# Patient Record
Sex: FEMALE | Race: BLACK OR AFRICAN AMERICAN | NOT HISPANIC OR LATINO | Employment: OTHER | URBAN - METROPOLITAN AREA
[De-identification: names, ages, dates, MRNs, and addresses within clinical notes are randomized per-mention and may not be internally consistent; named-entity substitution may affect disease eponyms.]

---

## 2018-04-13 PROBLEM — K44.0 HIATAL HERNIA WITH OBSTRUCTION BUT NO GANGRENE: Status: ACTIVE | Noted: 2018-04-13

## 2018-04-14 PROBLEM — K21.00 REFLUX ESOPHAGITIS: Status: RESOLVED | Noted: 2018-04-14 | Resolved: 2018-04-14

## 2018-04-14 PROBLEM — K44.0 HIATAL HERNIA WITH OBSTRUCTION BUT NO GANGRENE: Status: RESOLVED | Noted: 2018-04-13 | Resolved: 2018-04-14

## 2018-04-14 PROBLEM — K21.00 REFLUX ESOPHAGITIS: Status: ACTIVE | Noted: 2018-04-14

## 2023-10-13 PROBLEM — D34 THYROID ADENOMA: Status: ACTIVE | Noted: 2023-10-13

## 2023-10-13 PROBLEM — E04.9 ENLARGEMENT OF THYROID: Status: RESOLVED | Noted: 2023-10-13 | Resolved: 2023-10-13

## 2023-10-13 PROBLEM — E04.9 ENLARGEMENT OF THYROID: Status: ACTIVE | Noted: 2023-10-13

## 2023-10-13 PROBLEM — D34 THYROID ADENOMA: Status: RESOLVED | Noted: 2023-10-13 | Resolved: 2023-10-13

## 2024-05-29 DIAGNOSIS — C50.919 INVASIVE LOBULAR CARCINOMA OF BREAST IN FEMALE: Primary | ICD-10-CM

## 2024-05-30 PROBLEM — C50.911 INVASIVE LOBULAR CARCINOMA OF RIGHT BREAST IN FEMALE: Status: ACTIVE | Noted: 2024-05-30

## 2024-05-30 NOTE — PROGRESS NOTES
[facility] - Breast Surgery   History and Physical    Subjective:      Mara Dailey is a 73 y.o. female     No symptoms. Found on imaging.       PMHX  Hysterectomy   Right Cyst Aspiration   Left Breast Biopsy  (Benign) [place]         Relevant info:Patient is here with spouse       Family history of cancer: Father diagnosed with lung cancer @ 70 years old         Menarche at age 12 years old. . Age at first live birth 33 years old. did not breast feed. LMP . OCP-over 30+ years ago  partial Hysterectomy . Menopause at 53 years old. Denies HRT- Denies Personal history of ovarian/breast. Denies previous breast biopsy. Denies Genetic testing.       Never a smoker    Received Covid-19 vaccine left arm all shot and boosters            Patient Active Problem List   Diagnosis    Invasive lobular carcinoma of right breast in female     Current Outpatient Medications on File Prior to Visit   Medication Sig Dispense Refill    acetaminophen (TYLENOL) 500 MG tablet Take 1 tablet (500 mg total) by mouth every 6 (six) hours as needed for Pain.  0    latanoprost 0.005 % ophthalmic solution 1 drop.      losartan (COZAAR) 100 MG tablet Take 100 mg by mouth.      simvastatin (ZOCOR) 40 MG tablet Take 40 mg by mouth every evening.      valsartan-hydrochlorothiazide (DIOVAN-HCT) 320-25 mg per tablet Take 1 tablet by mouth once daily.      hydroCHLOROthiazide (HYDRODIURIL) 25 MG tablet Take 1 tablet (25 mg total) by mouth once daily. (Patient not taking: Reported on 10/9/2023) 30 tablet 11    omeprazole (PRILOSEC) 40 MG capsule Take 40 mg by mouth daily as needed. (Patient not taking: Reported on 2024)       No current facility-administered medications on file prior to visit.     Review of patient's allergies indicates:   Allergen Reactions    Grass pollen-landon grass standard      Past Medical History:   Diagnosis Date    Enlargement of thyroid 10/2023    Hiatal hernia with obstruction but no  gangrene 04/2018    Hyperlipidemia 04/2018    Hypertension     Reflux esophagitis 04/2018     Past Surgical History:   Procedure Laterality Date    AXILLARY HIDRADENITIS EXCISION Right 1980    BREAST BIOPSY Left 2011/2012    benign needle biopsy, done in [place]    BREAST CYST ASPIRATION Right     HYSTERECTOMY  2004    LAPAROSCOPIC REPAIR OF HIATAL HERNIA      THYROID LOBECTOMY Right 10/13/2023    Procedure: LOBECTOMY, THYROID;  Surgeon: Endy Ibarra MD;  Location: [facility];  Service: General;  Laterality: Right;     Family History   Problem Relation Name Age of Onset    Heart disease Mother      Kidney disease Mother      Diabetes Mother      Hypertension Mother      Cancer Father          lung     Social History     Socioeconomic History    Marital status:    Tobacco Use    Smoking status: Never    Smokeless tobacco: Never   Substance and Sexual Activity    Alcohol use: No    Drug use: No     Social Determinants of Health     Financial Resource Strain: Low Risk  (6/2/2024)    Overall Financial Resource Strain (CARDIA)     Difficulty of Paying Living Expenses: Not hard at all   Food Insecurity: No Food Insecurity (6/2/2024)    Hunger Vital Sign     Worried About Running Out of Food in the Last Year: Never true     Ran Out of Food in the Last Year: Never true   Physical Activity: Insufficiently Active (6/2/2024)    Exercise Vital Sign     Days of Exercise per Week: 2 days     Minutes of Exercise per Session: 10 min   Stress: No Stress Concern Present (6/2/2024)    [employer] - Occupational Stress Questionnaire     Feeling of Stress : Not at all   Housing Stability: Unknown (6/2/2024)    Housing Stability Vital Sign     Unable to Pay for Housing in the Last Year: No         Review of Systems    No CP, No SOB      Objective:      /80 (BP Location: Right arm, Patient Position: Sitting, BP Method: Medium (Manual))   Pulse 92   Temp 97 °F (36.1 °C) (Temporal)   Resp 16   " Ht 5' 6" (1.676 m)   Wt 79.3 kg (174 lb 13.2 oz)   SpO2 98%   BMI 28.22 kg/m²     Constitutional: NAD AAOX4  HEENT: EOMI, nonicteric sclera  NECK: no mass, no thyromegaly, no lymphadenopathy  CV: regular rate  PULM: good respiratory effort  ABDOMEN: soft, Nontender, nondistended. No guarding. No rebound.  MUSCULOSKELETAL: Good ROM  SKIN: No rashes or ulcerations seen.   NEUROLOGIC: CN grossly intact. Motor, sensory grossly intact    Breast exam   pendulous  Right: No mass, discharge, dimpling, lymphadenopathy  Left:  No mass, discharge, dimpling, lymphadenopathy  No masses appreciated      No visits with results within 6 Week(s) from this visit.   Latest known visit with results is:   Lab Visit on 03/18/2024   Component Date Value Ref Range Status    WBC 03/18/2024 4.89  3.90 - 12.70 K/uL Final    RBC 03/18/2024 5.34  4.00 - 5.40 M/uL Final    Hemoglobin 03/18/2024 15.2  12.0 - 16.0 g/dL Final    Hematocrit 03/18/2024 45.8  37.0 - 48.5 % Final    MCV 03/18/2024 86  82 - 98 fL Final    MCH 03/18/2024 28.5  27.0 - 31.0 pg Final    MCHC 03/18/2024 33.2  32.0 - 36.0 g/dL Final    RDW 03/18/2024 12.7  11.5 - 14.5 % Final    Platelets 03/18/2024 274  150 - 450 K/uL Final    MPV 03/18/2024 10.9  9.2 - 12.9 fL Final    Immature Granulocytes 03/18/2024 0.2  0.0 - 0.5 % Final    Gran # (ANC) 03/18/2024 2.5  1.8 - 7.7 K/uL Final    Immature Grans (Abs) 03/18/2024 0.01  0.00 - 0.04 K/uL Final    Comment: Mild elevation in immature granulocytes is non specific and   can be seen in a variety of conditions including stress response,   acute inflammation, trauma and pregnancy. Correlation with other   laboratory and clinical findings is essential.      Lymph # 03/18/2024 1.7  1.0 - 4.8 K/uL Final    Mono # 03/18/2024 0.4  0.3 - 1.0 K/uL Final    Eos # 03/18/2024 0.2  0.0 - 0.5 K/uL Final    Baso # 03/18/2024 0.05  0.00 - 0.20 K/uL Final    nRBC 03/18/2024 0  0 /100 WBC Final    Gran % 03/18/2024 50.5  38.0 - 73.0 % Final    " Lymph % 03/18/2024 35.6  18.0 - 48.0 % Final    Mono % 03/18/2024 8.0  4.0 - 15.0 % Final    Eosinophil % 03/18/2024 4.7  0.0 - 8.0 % Final    Basophil % 03/18/2024 1.0  0.0 - 1.9 % Final    Differential Method 03/18/2024 Automated   Final    Sodium 03/18/2024 138  136 - 145 mmol/L Final    Potassium 03/18/2024 3.7  3.5 - 5.1 mmol/L Final    Anion Gap reference range revised on 4/28/2023    Chloride 03/18/2024 101  95 - 110 mmol/L Final    CO2 03/18/2024 29  22 - 31 mmol/L Final    Glucose 03/18/2024 108  70 - 110 mg/dL Final    Comment: The ADA recommends the following guidelines for fasting glucose:    Normal:       less than 100 mg/dL    Prediabetes:  100 mg/dL to 125 mg/dL    Diabetes:     126 mg/dL or higher      BUN 03/18/2024 9  7 - 18 mg/dL Final    Creatinine 03/18/2024 0.86  0.50 - 1.40 mg/dL Final    Calcium 03/18/2024 9.7  8.4 - 10.2 mg/dL Final    Total Protein 03/18/2024 8.0  6.0 - 8.4 g/dL Final    Albumin 03/18/2024 4.2  3.5 - 5.2 g/dL Final    Total Bilirubin 03/18/2024 0.7  0.2 - 1.3 mg/dL Final    Alkaline Phosphatase 03/18/2024 105  38 - 145 U/L Final    AST 03/18/2024 32  14 - 36 U/L Final    ALT 03/18/2024 50 (H)  0 - 35 U/L Final    Anion Gap 03/18/2024 8  5 - 12 mmol/L Final    Anion Gap reference range revised on 4/28/2023    eGFR 03/18/2024 >60  >60 mL/min/1.73 m^2 Final    Cholesterol 03/18/2024 148  120 - 199 mg/dL Final    Comment: The National Cholesterol Education Program (NCEP) has set the  following guidelines (reference ranges) for Cholesterol:  Optimal.....................<200 mg/dL  Borderline High.............200-239 mg/dL  High........................> or = 240 mg/dL      Triglycerides 03/18/2024 103  30 - 150 mg/dL Final    Comment: The National Cholesterol Education Program (NCEP) has set the  following guidelines (reference values) for triglycerides:  Normal......................<150 mg/dL  Borderline High.............150-199 mg/dL  High........................200-499 mg/dL       HDL 03/18/2024 44  40 - 75 mg/dL Final    Comment: The National Cholesterol Education Program (NCEP) has set the  following guidelines (reference values) for HDL Cholesterol:  Low...............<40 mg/dL  Optimal...........>60 mg/dL      LDL Cholesterol 03/18/2024 83.4  63.0 - 159.0 mg/dL Final    Comment: The National Cholesterol Education Program (NCEP) has set the  following guidelines (reference values) for LDL Cholesterol:  Optimal.......................<130 mg/dL  Borderline High...............130-159 mg/dL  High..........................160-189 mg/dL  Very High.....................>190 mg/dL      HDL/Cholesterol Ratio 03/18/2024 29.7  20.0 - 50.0 % Final    Total Cholesterol/HDL Ratio 03/18/2024 3.4  2.0 - 5.0 Final    Non-HDL Cholesterol 03/18/2024 104  mg/dL Final    Comment: Risk category and Non-HDL cholesterol goals:  Coronary heart disease (CHD)or equivalent (10-year risk of CHD >20%):  Non-HDL cholesterol goal     <130 mg/dL  Two or more CHD risk factors and 10-year risk of CHD <= 20%:  Non-HDL cholesterol goal     <160 mg/dL  0 to 1 CHD risk factor:  Non-HDL cholesterol goal     <190 mg/dL      Total Protein 03/18/2024 8.0  6.0 - 8.4 g/dL Final    Albumin 03/18/2024 4.2  3.5 - 5.2 g/dL Final    Total Bilirubin 03/18/2024 0.7  0.2 - 1.3 mg/dL Final    AST 03/18/2024 32  14 - 36 U/L Final    ALT 03/18/2024 50 (H)  0 - 35 U/L Final    Alkaline Phosphatase 03/18/2024 105  38 - 145 U/L Final    Bilirubin, Direct 03/18/2024 0.5 (H)  0.0 - 0.3 mg/dL Final    T3, Free 03/18/2024 3.83  2.77 - 5.27 pg/mL Final    Comment: WARNING: Heterophilic antibodies in the serum or plasma of   certain individuals are known to cause interference with   immunoassays. These antibodies may be present in blood samples   from individuals regularly exposed to animals or who have been   treated with animal serum products.     Note: Patients taking very high Biotin doses of >300 mcg/day may   cause a negative bias in this  assay.      Free T4 2024 1.40  0.78 - 2.19 ng/dL Final    Comment: Performance of this assay has not been established with    specimens.  When interpretating FT4 results, note the potential   effects of certain drugs on the free-hormone equilibrium. Thyroid   hormone autoantibodies in serum or plasma samples may cause   interference in immunoassays    WARNING: Heterophilic antibodies in the serum or plasma of   certain individuals are known to cause interference with   immunoassays. These antibodies may be present in blood samples   from individuals regularly exposed to animals or who have been   treated with animal serum products.       TSH 2024 1.160  0.400 - 4.000 uIU/mL Final    Comment: Warning:  Heterophilic antibodies in serum or plasma of   certain individuals are known to cause interference with   immunoassays. These antibodies may be present in blood samples   from individuals regularly exposed to animal or who have been   treated with animal products.     Patients taking very high Biotin doses of >300 mcg/day may   cause a negative bias in this assay.      T3, Total 2024 106  60 - 180 ng/dL Final         Patient Active Problem List   Diagnosis    Invasive lobular carcinoma of right breast in female        High complexity of problems addressed by Dr. Hoffman - breast cancer, treatment options, expectations, effects of treatment, risks, benefits. Extensive and complex data reviewed, independent interpretation of tests, discussion of management with another health care provider.    2024 reviewed all complex data.  MA/NP functioned as a scribe.  Services and exam were personally performed, decisions made, complex data reviewed by Dr. Hoffman.      Alternative efficacious methods of treatment of breast cancer were given.  Options including lumpectomy, mastectomy, reconstruction options with advantages/disadvantages of each, provisions assuring coverage of reconstructive  surgery costs, how the patient may access reconstructive care including the potential to be transferred to a facility that provides reconstructive care or choosing reconstruction after completion of breast cancer surgery and treatment.  LDH, LCLTF breast cancer brochure given.    Evidence based NCCN and MD Kendall guidelines used for treatment planning.     Saw in Multi Disciplinary clinic with Dr Madera medical oncologist and Dr Nielson radiation oncologist.    I have given her all options - lumpectomy XRT, unilateral or bilateral mastectomy +/- reconstruction. I have explained procedure, risks, benefits, postop expectations. All questions answered. Risks include but are not limited to infection, bleeding, injury to nerves, vessels, numbness, loss of sensation, weakness, difficulty lifting arm, swelling of arm (lymphedema), inability to remove all lesion, residual breast tissue, recurrence of malignancy, need for further procedure, loss of skin flap, seroma (fluid collection), inability to find sentinel lymph node, need for axillary dissection, chronic pain, radioactive substance may be given, allergic reaction, staining of the skin, difficulty with future imaging, close or positive margins requiring additional surgery, vascular clotting, pulmonary embolus.        Imaging and Chronology:       2/14/2023 Bilateral Screening Mammo - [facility]   There is no mammographic evidence of malignancy.     BI-RADS Category: Overall: 1 - Negative    3/28/2024 Bilateral Screening Mammo - [facility]  Left Nml   Right breast 14 mm focal asymmetry at the lower outer posterior position. Assessment: 0 - Incomplete.      BI-RADS Category: Overall: 0 - Incomplete: Needs Additional Imaging Evaluation    4/16/2024 Right Dx Mammo, Right Limited US - [facility]   Right 1.5 cm x 0.8 cm x 1.1 cm irregularly shaped, hypoechoic mass with microlobulated margins with posterior enhancement seen in the right breast at 7 o'clock, 8 cm from the nipple. Assessment: 4  - Suspicious finding. Biopsy is recommended.      A benign 6mm round simple cyst is noted at 7:00, 8cmfn.     BI-RADS Category: Overall: 4 - Suspicious    4/19/2024 Right Breast 7 O'C, 8 CFN, CNB, Heart Clip - [facility]   5 mm, Invasive Lobular Carcinoma, Grade 2, (3, 2, 1)     ER 96.2   MD   96.5   HER2  (2+)   FISH  Neg  Ki67  10.9 %     5/29/2024 MRI Breast - [facility]   Left neg  Right 0700 LOQ 8cm FN 2.1x1.5x1.4cm mass (amended report for size)  8cm FN, 14mm from skin, 3cm from pec        Assessment/Plan:      Cancer Staging   Invasive lobular carcinoma of right breast in female  Staging form: Breast, AJCC 8th Edition  - Clinical stage from 5/30/2024: Stage IA (cT1c, cN0, cM0, G2, ER+, MD+, HER2-) - Signed by Jessica Hoffman MD on 5/30/2024    Right 0700 LOQ 8cm FN 1.5cm mass  Grade 2 ILC ER 96 MD 97 Her2 2+ FISH neg Ki 11    Options  Lump or mast, recon  Right SLNB      Right alee lump, right SLNB  6/26/24 Main OR (CSC ok)     Reviewed the diffuse enhancement on the right. Felt to be background by rad Dr Quinones      Xrt  Endocrine    Genetics  No clips with ILC

## 2024-06-03 ENCOUNTER — TELEPHONE (OUTPATIENT)
Dept: HEMATOLOGY/ONCOLOGY | Facility: CLINIC | Age: 74
End: 2024-06-03

## 2024-06-03 ENCOUNTER — OFFICE VISIT (OUTPATIENT)
Dept: SURGERY | Facility: CLINIC | Age: 74
End: 2024-06-03
Payer: MEDICARE

## 2024-06-03 ENCOUNTER — OFFICE VISIT (OUTPATIENT)
Dept: HEMATOLOGY/ONCOLOGY | Facility: CLINIC | Age: 74
End: 2024-06-03
Payer: MEDICARE

## 2024-06-03 ENCOUNTER — OFFICE VISIT (OUTPATIENT)
Dept: RADIATION ONCOLOGY | Facility: CLINIC | Age: 74
End: 2024-06-03
Payer: MEDICARE

## 2024-06-03 ENCOUNTER — DOCUMENTATION ONLY (OUTPATIENT)
Dept: SURGERY | Facility: CLINIC | Age: 74
End: 2024-06-03

## 2024-06-03 VITALS
WEIGHT: 174.81 LBS | SYSTOLIC BLOOD PRESSURE: 138 MMHG | RESPIRATION RATE: 16 BRPM | WEIGHT: 174.81 LBS | OXYGEN SATURATION: 98 % | HEART RATE: 92 BPM | BODY MASS INDEX: 28.09 KG/M2 | TEMPERATURE: 97 F | OXYGEN SATURATION: 98 % | HEART RATE: 92 BPM | SYSTOLIC BLOOD PRESSURE: 138 MMHG | TEMPERATURE: 97 F | BODY MASS INDEX: 28.09 KG/M2 | DIASTOLIC BLOOD PRESSURE: 80 MMHG | DIASTOLIC BLOOD PRESSURE: 80 MMHG | HEIGHT: 66 IN | RESPIRATION RATE: 16 BRPM | HEIGHT: 66 IN

## 2024-06-03 VITALS
BODY MASS INDEX: 28.09 KG/M2 | SYSTOLIC BLOOD PRESSURE: 138 MMHG | DIASTOLIC BLOOD PRESSURE: 80 MMHG | RESPIRATION RATE: 16 BRPM | TEMPERATURE: 97 F | OXYGEN SATURATION: 98 % | HEIGHT: 66 IN | HEART RATE: 92 BPM | WEIGHT: 174.81 LBS

## 2024-06-03 DIAGNOSIS — C50.919 INVASIVE LOBULAR CARCINOMA OF BREAST IN FEMALE: ICD-10-CM

## 2024-06-03 DIAGNOSIS — C50.911 INVASIVE LOBULAR CARCINOMA OF RIGHT BREAST IN FEMALE: Primary | ICD-10-CM

## 2024-06-03 PROCEDURE — 99999 PR PBB SHADOW E&M-EST. PATIENT-LVL III: CPT | Mod: PBBFAC,,, | Performed by: RADIOLOGY

## 2024-06-03 PROCEDURE — 99205 OFFICE O/P NEW HI 60 MIN: CPT | Mod: S$PBB,,, | Performed by: SURGERY

## 2024-06-03 PROCEDURE — 99205 OFFICE O/P NEW HI 60 MIN: CPT | Mod: S$PBB,,, | Performed by: RADIOLOGY

## 2024-06-03 PROCEDURE — 99999 PR PBB SHADOW E&M-EST. PATIENT-LVL III: CPT | Mod: PBBFAC,,, | Performed by: INTERNAL MEDICINE

## 2024-06-03 PROCEDURE — 99213 OFFICE O/P EST LOW 20 MIN: CPT | Mod: PBBFAC,27,PN | Performed by: INTERNAL MEDICINE

## 2024-06-03 PROCEDURE — 99213 OFFICE O/P EST LOW 20 MIN: CPT | Mod: PBBFAC,27,PN | Performed by: RADIOLOGY

## 2024-06-03 PROCEDURE — 99215 OFFICE O/P EST HI 40 MIN: CPT | Mod: PBBFAC,PN | Performed by: SURGERY

## 2024-06-03 PROCEDURE — 99205 OFFICE O/P NEW HI 60 MIN: CPT | Mod: S$PBB,,, | Performed by: INTERNAL MEDICINE

## 2024-06-03 PROCEDURE — 99999 PR PBB SHADOW E&M-EST. PATIENT-LVL V: CPT | Mod: PBBFAC,,, | Performed by: SURGERY

## 2024-06-03 PROCEDURE — G2211 COMPLEX E/M VISIT ADD ON: HCPCS | Mod: S$PBB,,, | Performed by: INTERNAL MEDICINE

## 2024-06-03 RX ORDER — LOSARTAN POTASSIUM 100 MG/1
100 TABLET ORAL
COMMUNITY
Start: 2024-05-21

## 2024-06-03 RX ORDER — LATANOPROST 50 UG/ML
1 SOLUTION/ DROPS OPHTHALMIC NIGHTLY
COMMUNITY
Start: 2024-02-26

## 2024-06-03 NOTE — PROGRESS NOTES
06/03/2024    [facility]  [facility]   Radiation Oncology Consultation    ASSESSMENT  This is a 73 y.o. y/o female with Stage IA (cT1c, N0, M0, Grade 2, ER+/TX+/HER2-) Invasive lobular carcinoma of the RIGHT breast. She is seen as part of Multidisciplinary Breast Clinic prior to initiation of therapy for discussion of treatment options.       PLAN    Treatment options were discussed with the patient including mastectomy vs breast conservation with lumpectomy and adjuvant RT.  We discussed the goals of treatment to be curative.  The risks, benefits, scheduling, alternatives to and rationale of radiation therapy were explained in detail.   We discussed the indications, course, and potential risks associated with radiation therapy, including but not limited to fatigue, local skin reaction such as hyperpigmentation, tenderness or erythema, breast pain, and potential long term toxicities such as rib fragility, and remote risks of lung inflammation, esophageal inflammation, heart inflammation, or secondary malignancy.  She expressed understanding of these risks, all questions were answered to her apparent satisfaction.   After this discussion, she elected to proceed with lumpectomy and adjuvant radiation therapy.    Consent was obtained and all questions were answered to the best of my ability  A CT simulation will be performed on 7/30/24 to begin the planning process for the patient's radiation therapy.     She was given our contact information, and she was told that she could call our clinic at any time if she has any questions or concerns.      Radiation Treatment Details:   We plan to treat right breast to a dose of 42.56 Gy in 16 fractions at 2.66 Gy per fraction delivered daily (no accelerated partial breast irradiation due to lobular histology) vs 26Gy in 5 fractions   We will utilize a(n) 3D technique.   We will utilize daily CT or orthogonal image guidance due to the need for accurate  daily patient alignment to treat the target volumes accurately and avoid radiation overdose to multiple regional organs at risk since we are treating the patient with complex target volumes with multiple steep isodose gradients.       Chief Complaint   Patient presents with    Other    Breast Cancer       HPI: The patient is a 73 y.o. year old female with a new diagnosis of breast cancer. She initially presented due to abnormal mammogram.     3/28/24 Screening mammogram:  right 1.4cm focal asymmetry in lower outer quadrant posterior    4/16/24 Right Diagnostic mammogram/ultrasound:   1.4cm high density oval mass at 7:00 8cmfn  By u/s: 1.5cm irregularly shaped hypoechoic mass at 7:00 8cmfn    4/19/24 right 7:00 mass biopsy: invasive lobular carcinoma, G2, +/+/equiv (- by FISH), Ki67 11%    5/29/24 (in system as 6/3/24) MRI Breasts: Right 2.5 x 1.5 x 1.4cm mass posterior 7:00 8cmfn    Possibility of pregnancy: No  History of prior irradiation: No  History of prior systemic anti-cancer therapy: No  History of collagen vascular disease: No  Implanted electronic device (pacer/defib/nerve stimulator): No      Past Medical History:   Diagnosis Date    Enlargement of thyroid 10/2023    Hiatal hernia with obstruction but no gangrene 04/2018    Hyperlipidemia 04/2018    Hypertension     Reflux esophagitis 04/2018       Past Surgical History:   Procedure Laterality Date    AXILLARY HIDRADENITIS EXCISION Right 1980    BREAST BIOPSY Left 2011/2012    benign needle biopsy, done in [place]    BREAST CYST ASPIRATION Right     HYSTERECTOMY  2004    LAPAROSCOPIC REPAIR OF HIATAL HERNIA      THYROID LOBECTOMY Right 10/13/2023    Procedure: LOBECTOMY, THYROID;  Surgeon: Endy Ibarra MD;  Location: [facility];  Service: General;  Laterality: Right;       Social History     Tobacco Use    Smoking status: Never    Smokeless tobacco: Never   Substance Use Topics    Alcohol use: No    Drug use: No       Cancer-related family history  "includes Cancer in her father.    Current Outpatient Medications on File Prior to Visit   Medication Sig Dispense Refill    acetaminophen (TYLENOL) 500 MG tablet Take 1 tablet (500 mg total) by mouth every 6 (six) hours as needed for Pain.  0    hydroCHLOROthiazide (HYDRODIURIL) 25 MG tablet Take 1 tablet (25 mg total) by mouth once daily. (Patient not taking: Reported on 10/9/2023) 30 tablet 11    latanoprost 0.005 % ophthalmic solution 1 drop.      losartan (COZAAR) 100 MG tablet Take 100 mg by mouth.      omeprazole (PRILOSEC) 40 MG capsule Take 40 mg by mouth daily as needed. (Patient not taking: Reported on 4/16/2024)      simvastatin (ZOCOR) 40 MG tablet Take 40 mg by mouth every evening.      valsartan-hydrochlorothiazide (DIOVAN-HCT) 320-25 mg per tablet Take 1 tablet by mouth once daily.       No current facility-administered medications on file prior to visit.       Review of patient's allergies indicates:   Allergen Reactions    Grass pollen-landon grass standard        Review of Systems   Constitutional:  Negative for chills, fever and malaise/fatigue.   Eyes:  Negative for blurred vision and double vision.   Respiratory:  Negative for cough and shortness of breath.    Cardiovascular:  Negative for chest pain.   Gastrointestinal:  Negative for nausea and vomiting.   Musculoskeletal:  Positive for joint pain (occasional knee, arthritis). Negative for back pain and neck pain.   Skin:  Negative for rash.   Neurological:  Negative for dizziness and headaches.        Vital Signs: /80 (BP Location: Right arm, Patient Position: Sitting, BP Method: Medium (Manual))   Pulse 92   Temp 97 °F (36.1 °C) (Temporal)   Resp 16   Ht 5' 6" (1.676 m)   Wt 79.3 kg (174 lb 13.2 oz)   SpO2 98%   BMI 28.22 kg/m²     ECOG Performance Status: 0 - Fully Active    Physical Exam  Chest:   Breasts:     Breasts are symmetrical.      Right: Normal.      Left: Normal.       Lymphadenopathy:      Upper Body:      Right upper " body: No supraclavicular, axillary or pectoral adenopathy.      Left upper body: No supraclavicular, axillary or pectoral adenopathy.            Imaging: I have personally reviewed the patient's available images and reports and summarized pertinent findings above in HPI.     Pathology: I have personally reviewed the patient's available pathology and summarized pertinent findings above in HPI.     This case was discussed with Dr. Hoffman and Dr. Madera      - Thank you for allowing me to participate in the care of your patient.    Marina Nielson MD

## 2024-06-03 NOTE — PROGRESS NOTES
Met with patient during her consult with .  Patient and I reviewed the information she discussed with Dr. Hoffman including treatment options, diagnosis, and future plans for workup and monitoring. Detailed information was discussed with the patient on lymphedema management, Integrative Oncology services offered, support groups and classes, genetics and hereditary cancers. Written copies were provided as well. Patient and I went through the NCCN patient guidelines book on Breast Cancer that was given to her, explained some of the information and why it is provided. Also given a copy of the [facility] Board of Medical Examiners brochure on Introductory Guide to Breast Cancer Treatment Options.  I have explained follow up appointments, surgical procedure, risks, benefits, postop instructions and expectations, she was also given a detailed copy of her post instructions and appointments, 's card and my card. She verbalized understanding of everything above and encouraged to call with any other questions or concerns  The following was explained in detail. The patient understands that this is a voluntary test.    PURPOSE: This test analyzes a specific gene or genes for genetic changes called mutations. This test will help determine if a person has a significantly increased risk if developing certain tumors due to a mutation in a cancer predisposing gene.    TEST RESULTS & INTERPRETATION: Possible result outcomes include positive, negative, and uncertain. A positive mutation is associated with an increased risk for hereditary cancer. A negative result is interpreted that a mutation was not identified. Uncertain means a genetic change was detected but it is not known if this change is linked to cancer risk.    BENEFITS: The benefits include informed choices about health care such as screening, risk reducing surgeries and preventive medication strategies.    RISKS: Concerns regarding possible health  insurance discrimination, most states and the federal government have enacted laws to prohibit genetic discrimination.    LIMITATIONS: This test analyzes only certain important genes associated with a specific hereditary cancer syndrome. Genetic testing clarifies cancer risks for only those cancers related to the genes analyzed.    FINANCIAL RESPONSIBILITY: Genetic testing of appropriate individuals is typically reimbursed by health insurance. You are responsible for any cost of the genetic test not reimbursed by insurance.     PATIENT INSTRUCTIONS & COLLECTION PROCESS:  Saliva collected.  Place tube into plastic box and sent off through [facility].  Informed patient results can take up to 2-4 weeks.  Will call patient with results.

## 2024-06-03 NOTE — NURSING
Met with patient and  in multi disciplinary clinic today.  Explained my role as navigator.  Reviewed plan of care and answered questions. She will see Dr. Alberts after surgery.  She understands we will schedule that appt after her final path report is back.  My contact information was provided and she was encouraged to call with any questions or concerns.  She and  verbalized understanding.

## 2024-06-03 NOTE — PROGRESS NOTES
Subjective     Patient ID: Mara Dailey is a 73 y.o. female.    Chief Complaint: No chief complaint on file.    HPI    Mrs. Dailey is a 73-year-old female with a recent diagnosis of infiltrating lobular carcinoma referred for initial evaluation.  A screening mammogram on 2024 showed an asymmetry in the lower outer quadrant of the right breast.  A mammogram and ultrasound on 2024 showed a 1.5 cm x 0.8 cm x 1.1 cm irregularly shaped hypoechoic mass.  No adenopathy was seen  A biopsy was performed 3 days later and showed a carcinoma that was ER strongly positive, TX strongly positive and HER2 2+ but negative by FISH    PAST MEDICAL HISTORY:  Significant for GERD, hypertension, hyperlipidemia  PAST SURGICAL HISTORY:  Significant for partial thyroidectomy in , hysterectomy due to fibroid tumors and surgery for hiatal hernia  SOCIAL HISTORY:  She used to work for [employer].  She is retired.  She is .  She does not drink alcohol does not smoke.  FAMILY HISTORY:  Maternal grandmother with possible cervical cancer  GYN HISTORY:  Menarche at 12 and 1st live birth at 28.  She is .  Menopause at 53.  No HRT    Review of Systems    Overall she feels well and she has no complaints other than mild pain from the recent breast biopsy.  Her ECOG PS is 1.  She denies any anxiety, depression, easy bruising, fevers, chills, night  sweats, weight loss, nausea, vomiting, diarrhea, constipation, diplopia, blurred vision, headache, chest pain, palpitations, shortness of breath, breast pain, abdominal pain, extremity pain, or difficulty ambulating.  The remainder of the ten-point ROS, including general, skin, lymph, H/N, cardiorespiratory, GI, , Neuro, Endocrine, and psychiatric is negative.     Physical Exam       She is alert, oriented to time, place, person, pleasant, well      nourished, in no acute physical distress.  She is accompanied by her                                     VITAL SIGNS:   Reviewed                                      HEENT:  Normal.  There are no nasal, oral, lip, gingival, auricular, lid,    or conjunctival lesions.  Mucosae are moist and pink, and there is no        thrush.  Pupils are equal, reactive to light and accommodation.              Extraocular muscle movements are intact.  Dentition is good.  There is no frontal or maxillary tenderness.                                     NECK:  Supple without JVD, adenopathy, or thyromegaly.  A thyroidectomy scar is seen.                  LUNGS:  Clear to auscultation without wheezing, rales, or rhonchi.           CARDIOVASCULAR:  Reveals an S1, S2, no murmurs, no rubs, no gallops.         ABDOMEN:  Soft, nontender, without organomegaly.  Bowel sounds are    present.                                                                     EXTREMITIES:  No cyanosis, clubbing, or edema.                               BREASTS:  Both breasts are large and pendulous.  No masses are palpated.  A scar from the recent biopsy is seen at the 7 o'clock position in the right breast                                     LYMPHATIC:  There is no cervical, axillary, or supraclavicular adenopathy.   SKIN:  Warm and moist, without petechiae, rashes, induration, or ecchymoses.           NEUROLOGIC:  DTRs are 0-1+ bilaterally, symmetrical, motor function is 5/5,  and cranial nerves are  within normal limits.    ASSESSMENT  Newly diagnosed ILC of the right breast, ER strongly positive, LA positive HER2 negative.  History of hypertension, hyperlipidemia       PLAN  I had a long discussion with Mrs. Dailey.  She has an appointment to meet with Dr. Hoffman later today.  If Dr. Hoffman is agreeable, I would recommend that she be treated with surgery upfront, (either lumpectomy or mastectomy) and the need for chemotherapy can be determined based on the size of the tumor, the lymph node status, and her Oncotype results.  Assuming that she has surgery upfront, I will  see her again in early July for follow-up.  Finally, she will need to undergo BRCA testing prior to the operation.  Her multiple questions were answered to her satisfaction.  I spent approximately 40 minutes reviewing the available records and 20 minutes evaluating the patient, in counseling and coordinating her care.  Visit today included increased complexity associated with the care of her newly diagnosed ILC and the various treatment options

## 2024-06-04 ENCOUNTER — PATIENT MESSAGE (OUTPATIENT)
Dept: HEMATOLOGY/ONCOLOGY | Facility: CLINIC | Age: 74
End: 2024-06-04
Payer: MEDICARE

## 2024-06-04 ENCOUNTER — TELEPHONE (OUTPATIENT)
Dept: HEMATOLOGY/ONCOLOGY | Facility: CLINIC | Age: 74
End: 2024-06-04
Payer: MEDICARE

## 2024-06-04 DIAGNOSIS — C50.911 INVASIVE LOBULAR CARCINOMA OF RIGHT BREAST IN FEMALE: Primary | ICD-10-CM

## 2024-06-04 NOTE — TELEPHONE ENCOUNTER
Lmovm for the patient about getting an IO appt scheduled per referral. I explained in detail what we offer and listed some symptoms/side effects that we can help address using our support services. I informed the patient that I would also send a portal message in regards to the appointment.

## 2024-06-18 ENCOUNTER — TELEPHONE (OUTPATIENT)
Dept: HEMATOLOGY/ONCOLOGY | Facility: CLINIC | Age: 74
End: 2024-06-18
Payer: MEDICARE

## 2024-06-18 NOTE — TELEPHONE ENCOUNTER
Spoke to pt to remind of appt tomorrow with Zulema. Pt verbalized understanding and confirmed appt Location was discussed.

## 2024-06-19 ENCOUNTER — OFFICE VISIT (OUTPATIENT)
Dept: HEMATOLOGY/ONCOLOGY | Facility: CLINIC | Age: 74
End: 2024-06-19
Payer: MEDICARE

## 2024-06-19 ENCOUNTER — CLINICAL SUPPORT (OUTPATIENT)
Dept: REHABILITATION | Facility: HOSPITAL | Age: 74
End: 2024-06-19
Payer: MEDICARE

## 2024-06-19 VITALS
HEART RATE: 68 BPM | HEIGHT: 66 IN | DIASTOLIC BLOOD PRESSURE: 59 MMHG | SYSTOLIC BLOOD PRESSURE: 126 MMHG | BODY MASS INDEX: 27.78 KG/M2 | WEIGHT: 172.88 LBS | TEMPERATURE: 97 F | OXYGEN SATURATION: 100 %

## 2024-06-19 DIAGNOSIS — C50.911 INVASIVE LOBULAR CARCINOMA OF RIGHT BREAST IN FEMALE: ICD-10-CM

## 2024-06-19 DIAGNOSIS — M25.519 SHOULDER PAIN, UNSPECIFIED CHRONICITY, UNSPECIFIED LATERALITY: ICD-10-CM

## 2024-06-19 DIAGNOSIS — Z91.89 AT RISK FOR LYMPHEDEMA: Primary | ICD-10-CM

## 2024-06-19 DIAGNOSIS — C50.911 INVASIVE LOBULAR CARCINOMA OF RIGHT BREAST IN FEMALE: Primary | ICD-10-CM

## 2024-06-19 PROCEDURE — 99999 PR PBB SHADOW E&M-EST. PATIENT-LVL IV: CPT | Mod: PBBFAC,,, | Performed by: NURSE PRACTITIONER

## 2024-06-19 PROCEDURE — 97110 THERAPEUTIC EXERCISES: CPT | Mod: PN

## 2024-06-19 PROCEDURE — 99214 OFFICE O/P EST MOD 30 MIN: CPT | Mod: PBBFAC,PN | Performed by: NURSE PRACTITIONER

## 2024-06-19 PROCEDURE — 97162 PT EVAL MOD COMPLEX 30 MIN: CPT | Mod: PN

## 2024-06-19 NOTE — PROGRESS NOTES
[facility]  Physical Therapy Initial Evaluation PRE-OP  Lymphedema Therapy    Visit Date: 6/19/2024     Name: Mara Dailey  Clinic Number: [id number]  Therapy Diagnosis:   Encounter Diagnosis   Name Primary?    Invasive lobular carcinoma of right breast in female    at risk for lymphedema  Physician: Jessica Hoffman MD  Physician Orders: PT Eval and Treat  Medical Diagnosis from Referral: invasive lobular carcinoma of r breast, at risk for lymphedema  Chart review pertaining to cancer hx:  Cancer Staging   Invasive lobular carcinoma of right breast in female  Staging form: Breast, AJCC 8th Edition  - Clinical stage from 5/30/2024: Stage IA (cT1c, cN0, cM0, G2, ER+, IA+, HER2-) - Signed by Jessica Hoffman MD on 5/30/2024     Right 0700 LOQ 8cm FN 1.5cm mass  Grade 2 ILC ER 96 IA 97 Her2 2+ FISH neg Ki 11     Options  Lump or mast, recon  Right SLNB        Right alee lump, right SLNB  6/26/24 Main OR ([facility] [place])      Evaluation Date: 6/19/2024  Authorization: pending  Plan of Care Expiration: PT f/u 8 weeks post-op  Reassessment Due: 8 weeks post op; plan to do functional shoulder eval at 4 weeks post surgery      Visit: 1 / 3  PTA Visit: -- / 5  Time In: 11:10  Time Out: 12:25  Total Billable Time: 75 minutes    Precautions: Standard,  avoid BP, IV, injections and blood draws in R UE    Subjective     Pt reports: R shoulder pain hurting, no breast pain    Surgery date: 6/26/2024  Radiation: will have some radiation therapy  Chemotherapy: endocrine therapy planned    Pain  Location: R shoulder  Current 5/10  Description: ache      Past Medical History:   Past Medical History:   Diagnosis Date    Enlargement of thyroid 10/2023    Hiatal hernia with obstruction but no gangrene 04/2018    Hyperlipidemia 04/2018    Hypertension     PONV (postoperative nausea and vomiting)     Reflux esophagitis 04/2018    Unspecified glaucoma        Past Surgical History:  has a past  surgical history that includes Axillary hidradenitis excision (Right, 1980); Hysterectomy (2004); Breast cyst aspiration (Right); Breast biopsy (Left, 2011/2012); Thyroid lobectomy (Right, 10/13/2023); Laparoscopic repair of hiatal hernia; Cataract extraction w/ intraocular lens implant (Left); and Cataract extraction w/ intraocular lens implant (Right).    Medications: has a current medication list which includes the following prescription(s): acetaminophen, calcium carbonate/vitamin d3, cyanocobalamin (vitamin b-12), hydrochlorothiazide, krill/om-3/dha/epa/phospho/ast, latanoprost, losartan, simvastatin, and valsartan-hydrochlorothiazide.    Allergies:   Review of patient's allergies indicates:   Allergen Reactions    Grass pollen-landon grass standard           Hand Dominance: Right  Diet: normal  Habitus: overweight  BMI: 28.15     Social History: lives with their spouse  Place of Residence (Steps/Adaptations): 1 step to enter  Occupation: Pt is retired.   Prior Exercise Routine: no formal ex routing, owns gym equipment  Prior Level of Function: indep  Current Level of Function:  indep    Patient's Goals: learn what she needs to know to recover and would like to address R shoulder pain after surgery   present for support and learning  Objective     Mental Status: Alert/Oriented    Observations  Posture: R shoulder position forward compared to L, forward head  Joint Integrity: WFLs bilateral  Skin Integrity: intact bilateral  Edema: none bilateral    Sensation  Light Touch: intact bilateral  Proprioception: intact bilateral    A/PROM  (L) UE: WFLs  (R) UE: WFLs  Limitations:  Full shoulder ROM painful at endrange for shoulder abduction and flexion on R    STRENGTH  (L) UE: WFLs  (R) UE: WFLs except below  Limitations:  Decreased scapular stabilization B, decreased ER and shoulder ext strength on R     Baseline Measurements of BUEs  LANDMARK RIGHT UE (cm)   W + 18 inches 37.6   W +  16 inches 35.5   Elbow  27.8    W + 7  inches 25.6    W + 5 inches 22.0   Wrist 16.2    DPC 19.0    IP Thumb 6.6   length 18.5 inches   Garments recommended: Sigvaris Secure upper arm sleeve in size large long length with 15-20 mmHg with silicone band at top. Sigvaris Secure gauntlet in size small with 15-20 mmHg. Please begin wearing these at 2 weeks post op for one year during the day pro-phylactically.   Good choice for bra wear is sports bra while you are undergoing cancer care.       Functional Mobility   Bed mobility: independent   Roll to left: independent   Roll to right: independent   Supine to prone: independent   Scooting to edge of bed: independent   Supine to sit: independent   Sit to supine: independent   Transfers to bed: independent   Transfers to toilet: independent   Sit to stand: independent   Stand pivot: independent   Car transfers: independent     Gait Assessment  AD used: none  Assistance: independent  Distance: community distances  Endurance: WFL     Gait Pattern: wfl       Treatment     Treatment Time In: 11:30  Treatment Time Out: 12:15  Total Treatment time separate from Evaluation: 45 minutes      Self-Care/Home Management to improve behavioral/activity modifications related to ADLs, compensatory training, safety procedures, and adaptive equipment for 20 minutes including:  Garments: PT recommend wearing garments for one year per guidelines for prophylactical assist to decrease risk for lymphedema  Skin care  Weight management  Sleep  Nutrition  Infection prevention      Therapeutic Exercise to develop ROM, flexibility, and posture for 20 minutes including:  Surgical protocol for position recommendations  Diaphragmatic Breathing  Exercises by day, complete with pictures of exercises and description for safe progression of motion      Education: Instructed on general anatomy/physiology, lymphedema information (definitions, signs, symptoms, precautions), role of therapy in multi-disciplinary team, purpose of  lymphedema physical therapy and the benefits/risks of treatment, risks of refusing treatment, POC, and goals for therapy were discussed with the pt.    Written Home Exercises Provided: yes.  Exercises were reviewed and Mara was able to demonstrate them prior to the end of the session. Mara demonstrated good  understanding of the education provided.     See EMR under Patient Instructions for exercises provided 6/19/2024.    Assessment     Mara is a 73 y.o. female referred to outpatient physical therapy with a medical diagnosis of invasive lobular carcinoma of r breast, at risk for lymphedema with surgical procedure planned on 6/26/2024. Pt was seen today pre-operatively to assess strength and ROM of BUEs, to take baseline circumferential measurements of BUEs to aid in the early detection of lymphedema post-operatively, and to provide pt education on exercises/precautions post-operative. Pt does not exhibit any ROM impairments currently. This pt will benefit from skilled PT for reassessment of baseline measurements 6-8 week post-operatively and to address future impairments following surgery such as pain, limited ROM, or decreased mobility. Will need to wait until pt is medically cleared to determine if pt is safe for MLD, pneumatic compression pump, or lymphatouch treatments.      Plan of care discussed with patient: Yes  Pt's spiritual, cultural and educational needs considered and patient is agreeable to the plan of care and goals as stated below:     Anticipated barriers for therapy:  none    Medical Necessity is demonstrated by the following:  History  Co-morbidities and personal factors that may impact the plan of care Co-morbidities:   high BMI, history of cancer, HTN, and thyroidectomy     Personal Factors:   none     high   Examination  Body Structures and Functions, activity limitations and participation restrictions that may impact the plan of care Body Systems:    Pain with overhead motion in R  jane    Activity limitations:   Mobility  lifting and carrying objects    Self care  no deficits    Domestic Life  no deficits    Participation Restrictions:   none         moderate   Clinical Presentation evolving clinical presentation with changing clinical characteristics moderate   Decision Making/ Complexity Score: moderate       GOALS  Short Term Goals: 3 months  Pt to be seen for reassessment in 6-8 weeks after surgery.  Pt will demonstrate 100% knowledge of lymphedema precautions and signs of infection.  Pt to obtain compression garments for prophylactic concerns according to APTA clinical guidelines published in Journal of Physical Therapy.  4. Provide pt with HEP and theraband at the 8 week post surgical post hab visit for following protocol for cancer survivorship  Long Term Goals: deferred    Plan     Plan of Care: 6/19/2024 to 8/31/2024.    Patient to be seen in 8 weeks following surgical date for 2 visits for reassessment. Patient will benefit from Outpatient Physical Therapy services which may include the following interventions: patient education, HEP, therapeutic exercises, neuromuscular re-education, therapeutic activity, manual therapy, self care/home management, modalities, gait training, decongestive massage, multi-layered bandaging, self massage, self bandaging, and assistance in obtaining appropriate compression garment.      If pt is to undergo XRT, POC must exclude MLD, pneumatic compression pump, and lymphatouch to any radiated area.       Yahaira Mendez, PT, CLT

## 2024-06-19 NOTE — PROGRESS NOTES
"Mara Dailey  73 y.o. is here to seek an integrative approach to discuss side effects related to breast cancer treatment. Mara Dailey  was referred by Dr. Hoffman     HPI  Mrs. Dailey was diagnosed with breast cancer after a routine mammogram. She reports she is doing well and not worried. She reports initially she went in her room and cried and then came out and said "why no me Edison, there is a reason." She denies stress and anxiety over the diagnosis. She had hernia surgery in March and was on a liquid diet for 6 weeks which caused some weakness. She is now healed and feels well. She has a good appetite and follows a healthy diet. She sleeps well at night   She is moderately active throughout the day. She does not do any formal exercise. She plans on starting back on her treadmill.   They have been  for 11 years and have 6 children combined. She is retired from the [employer].  She has a good support system.       7 pillars Assessment    Sleep  How many hours of sleep per night? 8 hours  Do you have trouble falling asleep, staying asleep or waking up earlier than you need to? no  Do you have daytime fatigue? no  Do you need medication for sleep? no  Do you use any supplements or other interventions for sleep? no    Resilience  Rate your current level of stress- low    Purpose  Do you feel you have a vision or a life purpose? Yes    Environment  Any exposures:no known exposures    Spirituality-  Oriental orthodox    Nutrition   Food allergies or sensitivities: no  Do you adhere to a particular type of diet? no  Do you have any concerns with your eating habits? no    Exercise  How would you describe your physical activity level? moderate    Past Medical History  Past Medical History:   Diagnosis Date    Enlargement of thyroid 10/2023    Hiatal hernia with obstruction but no gangrene 04/2018    Hyperlipidemia 04/2018    Hypertension     PONV (postoperative nausea and vomiting)     Reflux esophagitis " 04/2018    Unspecified glaucoma       Past Surgical History   Past Surgical History:   Procedure Laterality Date    AXILLARY HIDRADENITIS EXCISION Right 1980    BREAST BIOPSY Left 2011/2012    benign needle biopsy, done in [place]    BREAST CYST ASPIRATION Right     CATARACT EXTRACTION W/ INTRAOCULAR LENS IMPLANT Left     CATARACT EXTRACTION W/ INTRAOCULAR LENS IMPLANT Right     HYSTERECTOMY  2004    LAPAROSCOPIC REPAIR OF HIATAL HERNIA      THYROID LOBECTOMY Right 10/13/2023    Procedure: LOBECTOMY, THYROID;  Surgeon: Endy Ibarra MD;  Location: [facility];  Service: General;  Laterality: Right;      Family History   Family History   Problem Relation Name Age of Onset    Heart disease Mother      Kidney disease Mother      Diabetes Mother      Hypertension Mother      Cancer Father          lung      Allergies  Review of patient's allergies indicates:   Allergen Reactions    Grass pollen-june grass standard       Current Medications:    Current Outpatient Medications:     acetaminophen (TYLENOL) 500 MG tablet, Take 1 tablet (500 mg total) by mouth every 6 (six) hours as needed for Pain., Disp: , Rfl: 0    calcium carbonate/vitamin D3 (VITAMIN D-3 ORAL), Take 1 tablet by mouth every other day., Disp: , Rfl:     cyanocobalamin, vitamin B-12, (VITAMIN B-12 ORAL), Take 1 tablet by mouth every other day., Disp: , Rfl:     hydroCHLOROthiazide (HYDRODIURIL) 25 MG tablet, Take 1 tablet (25 mg total) by mouth once daily., Disp: 30 tablet, Rfl: 11    krill/om-3/dha/epa/phospho/ast (MAXIMUM RED KRILL OMEGA-3 ORAL), Take 1 tablet by mouth every other day., Disp: , Rfl:     latanoprost 0.005 % ophthalmic solution, Place 1 drop into both eyes every evening., Disp: , Rfl:     losartan (COZAAR) 100 MG tablet, Take 100 mg by mouth., Disp: , Rfl:     simvastatin (ZOCOR) 40 MG tablet, Take 40 mg by mouth every evening., Disp: , Rfl:     valsartan-hydrochlorothiazide (DIOVAN-HCT) 320-25 mg per tablet, Take 1 tablet by mouth once  "daily., Disp: , Rfl:      Review of Systems  Review of Systems   Constitutional: Negative.    HENT: Negative.     Eyes: Negative.    Respiratory: Negative.     Cardiovascular: Negative.    Gastrointestinal: Negative.    Genitourinary: Negative.    Musculoskeletal: Negative.    Skin: Negative.    Neurological: Negative.    Endo/Heme/Allergies: Negative.    Psychiatric/Behavioral: Negative.          Physical Exam      Vitals:    06/19/24 1306   BP: (!) 126/59   BP Location: Left arm   Patient Position: Sitting   BP Method: Medium (Automatic)   Pulse: 68   Temp: 97.3 °F (36.3 °C)   TempSrc: Temporal   SpO2: 100%   Weight: 78.4 kg (172 lb 14.4 oz)   Height: 5' 6" (1.676 m)     Body mass index is 27.91 kg/m².  Physical Exam  Vitals reviewed.   Constitutional:       Appearance: Normal appearance.   Neurological:      Mental Status: She is alert.   Psychiatric:         Mood and Affect: Mood normal.         Behavior: Behavior normal.        ASSESSMENT :  1. Invasive lobular carcinoma of right breast in female       PLAN:  Reviewed all information discussed at today's visit and all questions were answered.    Counseled on healthy lifestyle and behavior modifications: Encouraged an increase in physical activity, with the goal of exercising at least 150 minutes per week of moderate intensity aerobic activity   I discussed and recommended the following support services:  Maicol Chi and Yoga I suggested Maicol Chi and/or Yoga as these practices reduce stress, increases flexibility and muscle strength, improves balance and promotes serenity in the power of movement to help fight disease and boost your immune system.   Music and relaxation therapy and Meditation which can decrease stress by lowering blood pressure, slowing breathing, and helping you be more present in the moment. It improves sleep by relaxing the body and mind at the end of the day.Meditation also trains you how to focus on one thing at a time, improving concentration. " It also promotes emotional well-being by decreasing depression and anxiety, and helping create a more positive outlook on life.    Follow up with [facility] in 4 months, possible survivorship visit    I spent a total of 30 minutes on the day of the visit.This includes face to face time and non-face to face time preparing to see the patient (eg, review of tests), obtaining and/or reviewing separately obtained history, documenting clinical information in the electronic or other health record, independently interpreting results and communicating results to the patient/family/caregiver, or care coordinator.

## 2024-06-19 NOTE — PATIENT INSTRUCTIONS
Garment Recommendations:  Sigvaris Secure upper arm sleeve in size large long length with 15-20 mmHg with silicone band at top. Sigvaris Secure gauntlet in size small with 15-20 mmHg. Please begin wearing these at 2 weeks post op for one year during the day pro-phylactically.   Good choice for bra wear is sports bra while you are undergoing cancer care.      PT will obtain orders for the garments. Please call your secondary insurance to see if they will cover. If insurance does not cover, then please contact [employer] the  to see if  you qualify for tran assistance to assist with obtaining tran for garments.  When you call your insurance, tell them this is for prevention of lymphedema since you are having a node involved with surgery and will be having radiation. This places you more at risk for lymphedema.      Reason to NOT do housework: seroma formation, therefore until full healed need to not do repetitive activities.   Post Operative Breast Cancer Surgery Exercises     After surgery your shoulder and chest may feel tight and sore. Movement may cause stretching across your chest, axilla (armpit), and the incision site limiting your ability to raise your arm. Exercise will be extremely important in preventing swelling and in helping you regain movement, strength, and use of your arm.      Your post-operative exercise program can be divided into three stages. Your surgeon will inform you when to move to the next stage.      STAGE TIME PURPOSE EXERCISE   I Post-op day 0 to 4  (Drains are in) To prevent and/or reduce swelling - Positioning  - Hand and arm precautions   II Post-op day 5 to 14  (After drains are removed) To provide gentle movement without much stretching  - Shoulder shrugs  - Shoulder retraction   III Post-op day 15-6 wks  (After suture are removed) To stretch and regain full motion - Wall ladder  - Range of motion exercises  - Wand exercises         These exercises are general  guideline for use following a mastectomy. Please consult your physician for additional information. Of a tissue expander has been placed, or if you have had reconstruction, you must get approval from your physician before beginning exercises.   Check with your physician prior to beginning a new stage.  Avoid elbows above shoulders until after post-op day 14.     STAGE 1              Abdominal Breathing Exercises      Get comfortable and relax your neck and shoulders. You can sit or lie down. Place one hand on your upper chest and place the other hand on your belly button. Use your hands to feel the movements as you breathe in and out.  Take a deep breath in through your nose and feel the hand on your stomach move out. Do not let your shoulders move up. The hand on your chest should not move.   Breathe out slow and gentle through your mouth. Pucker your lips as if you were going to whistle or blow out a candle. The hand on your stomach should move in as you breathe out. Breathe out as long as you can until all the air is gone.   To help keep the lymphatic system moving well, practice two breaths every hour using the steps for abdominal breathing exercises.                Positioning    Several times a day, elevate your arm on pillows so your hand is above the level of your heart. This position will allow gravity to drain excess fluids out of your hand and arm. Try to place pillows under involved arm while in sitting position or while laying down.                                                                                                       STAGE 2               Shoulder Shrugs Shoulder Retraction    While sitting with arms supported, shrug both of your shoulder and then relax. Repeat 10 times, 3 times a day.   While sitting or standing, pull both shoulder back, bringing shoulder blades together. Repeat 10 times,   3 times a day.          STAGE 3  Range of Motion Exercises go to stretch not to pain     To  retain full motion of your shoulder, it must be moved in all planes, several times a day. Begin arm range of motion exercises with 10 reps of each, 2 times a day. Progress to 3 x 10 reps, as tolerated 2 times a day.           Wand - Shoulder Flexion        Lay on your back in a comfortable position. Raise both arms overhead, then bring back down by your side.                Wand - Shoulder Abduction        Lay on your back in a comfortable position. Raise both arms out to your side, then bring back down by your side.                   Wall Climbing - Shoulder Flexion       Stand facing the wall and extend the involved arm directly in front of you so that your fingertips touch the wall (at arm's length away from the wall). Creep up the side of the wall with your fingertips, taking a step toward the wall as you reach higher and higher up the wall. Repeat this procedure going down the wall, but taking a step backward this time. Begin with 5 wall climbs, then progress to 10 reps at a time, 1-2 times a day.                Wall Climbing - Shoulder Abduction     ([email or web address]/11/[email or web address]  home-exercises-for-frozen-shoulder/) Repeat the above procedure, but this time position yourself perpendicular (at a right angle) to the wall so that the involved arm will extend sideways up the wall. Keep your trunk straight, not leaning toward the wall or shrugging your shoulder. Place a emir (tape) on the wall each week to see if you are making progress. Begin with 5 wall climbs, then progress to 10 reps at a time, 1-2 times a day.          PRECAUTIONS     As a result of the removal of lymph nodes and vessels, your arm is susceptible to infection and swelling. A hot, reddened or swollen arm denotes the presence of infection and should be brought to the attention of your physician immediately. Try to avoid cuts, scratches, pinpricks, hangnails, sunburns, insect bites, chemical irritation, and irritating detergent.     The  following are helpful hints:     Avoid injections, blood draws, blood pressure, and IVs to be done to your involved arm. If you have undergone axillary dissection, then consider using the opposite only for injections, blood draws, blood pressure, and IVs.  Prevent chapping of your hand and arm by applying lotion liberally several times a day. Recommend Eucerin lotion, No massages to affected upper extremity if you have had lymph nodes dissection or radiation to lymph nodes.   Do not cut nails too close to the quick. Do not cut or pick your cuticles. Use cuticle cream or remover.  Avoid carrying heavy objects (over 5 lbs) with your involved arm.   Protect your arm from burns with small electrical appliances such as irons, curling irons, and frying pans.   Wear sunscreen in the sun.  Apply insect repellent when going to areas where you might be exposed to insect bites.   Use an electric razor for shaving.   Wear loose fitting work/rubber gloves when washing dishes, using , or using steel wool.  Wear garden gloves when gardening or arranging thorn flowers.  Do not wear tight jewelry on your affected arm.  Do not wear narrow tight straps on clothing or under garments.     LIMIT repetitive activity, such as house work, typing etc to reduce chance to develop seroma.      IMPORTANT     If you do cut, burn, or armando the skin, wash the area and use an antiseptic. If it becomes red, warm, or unusually hard or swollen, contact your physician immediately.      If there is swelling without redness, increased warmth or hardness, the positioning and pumping exercises as described above can help decrease the swelling. Position your hand higher than the elbow, elbow higher than the shoulder, and shoulder higher than your heart. Maintain this position for 30 minutes. Repeat as necessary.

## 2024-06-24 ENCOUNTER — TUMOR BOARD CONFERENCE (OUTPATIENT)
Dept: RADIATION ONCOLOGY | Facility: CLINIC | Age: 74
End: 2024-06-24
Payer: MEDICARE

## 2024-06-28 NOTE — PROGRESS NOTES
[facility]      BREAST MULTIDISCIPLINARY TUMOR BOARD    Mara Fitzpatrick    Date Presented to Tumor Board: 06/24/24    Presenting Hospital / Clinic: [facility]    Tumor Laterality: Right    Breast Tumor Site: LOQ    Presenter: Dr. Jessica Hoffman    Reason for Consultation: Initial Presentation    Specialties Present: Medical Oncology;Hematology;Radiation Oncology;Surgical Oncology;Pathology;Navigation;Integrative Oncology;Plastic Surgery;Radiology    Patient Status: a current patient    Treatment to Date: None    Clinical Trial Eligibility: None available    ER: Positive    ME: Positive    Her2: Negative    Cancer Staging   Invasive lobular carcinoma of right breast in female  Staging form: Breast, AJCC 8th Edition  - Clinical stage from 5/30/2024: Stage IA (cT1c, cN0, cM0, G2, ER+, ME+, HER2-) - Signed by Jessica Hoffman MD on 5/30/2024  - Pathologic: pT1c, cN0, cM0, G2, ER+, ME+, HER2- - Signed by Alexandro Madera MD on 6/3/2024    Recommended Therapy:  Genetic testing - Negative  Surgery - Right Jessica Lumpectomy with SLNB  Radiation   Chemotherapy/Endocrine Therapy  Final recommendations pending surgical  path      Metastatic Site(s): None    Presentation at Cancer Conference: Prospective

## 2024-07-01 ENCOUNTER — OFFICE VISIT (OUTPATIENT)
Dept: HEMATOLOGY/ONCOLOGY | Facility: CLINIC | Age: 74
End: 2024-07-01
Payer: MEDICARE

## 2024-07-01 VITALS
BODY MASS INDEX: 27.95 KG/M2 | OXYGEN SATURATION: 98 % | SYSTOLIC BLOOD PRESSURE: 136 MMHG | TEMPERATURE: 96 F | HEIGHT: 66 IN | DIASTOLIC BLOOD PRESSURE: 78 MMHG | WEIGHT: 173.94 LBS | HEART RATE: 72 BPM | RESPIRATION RATE: 18 BRPM

## 2024-07-01 DIAGNOSIS — C50.911 INVASIVE LOBULAR CARCINOMA OF RIGHT BREAST IN FEMALE: Primary | ICD-10-CM

## 2024-07-01 PROCEDURE — 99214 OFFICE O/P EST MOD 30 MIN: CPT | Mod: PBBFAC,PN | Performed by: INTERNAL MEDICINE

## 2024-07-01 PROCEDURE — 99999 PR PBB SHADOW E&M-EST. PATIENT-LVL IV: CPT | Mod: PBBFAC,,, | Performed by: INTERNAL MEDICINE

## 2024-07-01 NOTE — Clinical Note
I need to see her in 2-3 weeks please.  Angela, if the tumor is more than 10 mm please send an Oncotype

## 2024-07-01 NOTE — PROGRESS NOTES
Subjective     Patient ID: Mara Dailey is a 73 y.o. female.    Chief Complaint: Invasive lobular carcinoma of right breast in female    HPI    Mrs. Dailey returns today for follow-up.  I had seen her initially 4 weeks ago.  Since then she underwent a lumpectomy and sentinel lymph node biopsy on the 26th of June.  Unfortunately, the pathology report is not available for me to review.  Briefly, she  is a 73-year-old female with a recent diagnosis of infiltrating lobular carcinoma referred for initial evaluation.  A screening mammogram on 03/28/2024 showed an asymmetry in the lower outer quadrant of the right breast.  A mammogram and ultrasound on April 16, 2024 showed a 1.5 cm x 0.8 cm x 1.1 cm irregularly shaped hypoechoic mass.  No adenopathy was seen  A biopsy was performed 3 days later and showed a carcinoma that was ER strongly positive, ME strongly positive and HER2 2+ but negative by FISH  As mentioned above, she had a lumpectomy and sentinel lymph node biopsy on June 26, 2024.  She is still recuperating from the operation.    Review of Systems    Overall she feels well and she has no complaints other than mild pain from the recent breast surgery.  Her ECOG PS is 1.  She denies any anxiety, depression, easy bruising, fevers, chills, night  sweats, weight loss, nausea, vomiting, diarrhea, constipation, diplopia, blurred vision, headache, chest pain, palpitations, shortness of breath, breast pain, abdominal pain, extremity pain, or difficulty ambulating.  The remainder of the ten-point ROS, including general, skin, lymph, H/N, cardiorespiratory, GI, , Neuro, Endocrine, and psychiatric is negative.     Physical Exam       She is alert, oriented to time, place, person, pleasant, well      nourished, in no acute physical distress.  She is accompanied by her                                     VITAL SIGNS:  Reviewed                                      HEENT:  Normal.  There are no nasal, oral, lip,  gingival, auricular, lid,    or conjunctival lesions.  Mucosae are moist and pink, and there is no        thrush.  Pupils are equal, reactive to light and accommodation.              Extraocular muscle movements are intact.  Dentition is good.  There is no frontal or maxillary tenderness.                                     NECK:  Supple without JVD, adenopathy, or thyromegaly.  A thyroidectomy scar is seen.                  LUNGS:  Clear to auscultation without wheezing, rales, or rhonchi.           CARDIOVASCULAR:  Reveals an S1, S2, no murmurs, no rubs, no gallops.         ABDOMEN:  Soft, nontender, without organomegaly.  Bowel sounds are    present.                                                                     EXTREMITIES:  No cyanosis, clubbing, or edema.                               BREASTS:  Both breasts are large and pendulous.  No masses are palpated.  There is a healing incision in the upper outer quadrant of the right breast.  It is healing nicely.  There are no masses palpated in either breast.                                  LYMPHATIC:  There is no cervical, axillary, or supraclavicular adenopathy.   SKIN:  Warm and moist, without petechiae, rashes, induration, or ecchymoses.           NEUROLOGIC:  DTRs are 0-1+ bilaterally, symmetrical, motor function is 5/5,  and cranial nerves are  within normal limits.    ASSESSMENT  Newly diagnosed ILC of the right breast, ER strongly positive, CT positive HER2 negative.  History of hypertension, hyperlipidemia       PLAN  I had a long discussion with Mrs. Dailey.    At this point we will proceed as follows:  1. She has an appointment to meet with Dr. Nielson on the 30th of July  2. I will see her in 2 weeks.  3. If her tumors> 10 mm we will automatically request an Oncotype.    Her multiple questions were answered to her satisfaction.  I spent approximately 20 minutes reviewing the available records and 15 minutes evaluating the patient, in counseling and  coordinating her patient's care.  Visit today included increased complexity associated with the care of her newly diagnosed ILC and the various treatment options

## 2024-07-05 ENCOUNTER — DOCUMENTATION ONLY (OUTPATIENT)
Dept: SURGERY | Facility: CLINIC | Age: 74
End: 2024-07-05
Payer: MEDICARE

## 2024-07-05 NOTE — PROGRESS NOTES
Order for Oncotype faxed to [facility], along with pathology, face sheet with demographics and a copy of the insurance card.

## 2024-07-22 ENCOUNTER — OFFICE VISIT (OUTPATIENT)
Dept: HEMATOLOGY/ONCOLOGY | Facility: CLINIC | Age: 74
End: 2024-07-22
Payer: MEDICARE

## 2024-07-22 ENCOUNTER — PATIENT MESSAGE (OUTPATIENT)
Dept: HEMATOLOGY/ONCOLOGY | Facility: CLINIC | Age: 74
End: 2024-07-22
Payer: MEDICARE

## 2024-07-22 ENCOUNTER — TELEPHONE (OUTPATIENT)
Dept: HEMATOLOGY/ONCOLOGY | Facility: CLINIC | Age: 74
End: 2024-07-22
Payer: MEDICARE

## 2024-07-22 VITALS
TEMPERATURE: 97 F | OXYGEN SATURATION: 99 % | BODY MASS INDEX: 27.46 KG/M2 | RESPIRATION RATE: 17 BRPM | HEIGHT: 66 IN | WEIGHT: 170.88 LBS | DIASTOLIC BLOOD PRESSURE: 79 MMHG | SYSTOLIC BLOOD PRESSURE: 122 MMHG | HEART RATE: 79 BPM

## 2024-07-22 DIAGNOSIS — C50.911 INVASIVE LOBULAR CARCINOMA OF RIGHT BREAST IN FEMALE: Primary | ICD-10-CM

## 2024-07-22 DIAGNOSIS — Z91.89 AT RISK FOR LYMPHEDEMA: ICD-10-CM

## 2024-07-22 PROCEDURE — 99213 OFFICE O/P EST LOW 20 MIN: CPT | Mod: PBBFAC,PN | Performed by: INTERNAL MEDICINE

## 2024-07-22 PROCEDURE — 99215 OFFICE O/P EST HI 40 MIN: CPT | Mod: S$PBB,,, | Performed by: INTERNAL MEDICINE

## 2024-07-22 PROCEDURE — G2211 COMPLEX E/M VISIT ADD ON: HCPCS | Mod: S$PBB,,, | Performed by: INTERNAL MEDICINE

## 2024-07-22 PROCEDURE — 99999 PR PBB SHADOW E&M-EST. PATIENT-LVL III: CPT | Mod: PBBFAC,,, | Performed by: INTERNAL MEDICINE

## 2024-07-22 NOTE — PROGRESS NOTES
Subjective     Patient ID: Mara Dailey is a 73 y.o. female.    Chief Complaint: No chief complaint on file.    HPI    Mrs. Dailey returns today for follow-up.  I had seen her initially in June and her last visit was on July 1, 2024.   On June 26, 2924 she underwent a lumpectomy and sentinel lymph node biopsy   The pathology report indicates she had a 1.8 cm invasive ductal carcinoma along with DCIS.  Resection margins were clear while 2 sentinel lymph nodes were negative.  The Oncotype score is 0 indicating a 97% chance of disease-free survival at 9 years with aromatase inhibitors    Briefly, she  is a 73-year-old female with a recent diagnosis of infiltrating lobular carcinoma referred for initial evaluation.  A screening mammogram on 03/28/2024 showed an asymmetry in the lower outer quadrant of the right breast.  A mammogram and ultrasound on April 16, 2024 showed a 1.5 cm x 0.8 cm x 1.1 cm irregularly shaped hypoechoic mass.  No adenopathy was seen  A biopsy was performed 3 days later and showed a carcinoma that was ER strongly positive, NC strongly positive and HER2 2+ but negative by FISH  As mentioned above, she had a lumpectomy and sentinel lymph node biopsy on June 26, 2024 with results as above.     Review of Systems    Overall she feels well and she has no complaints and states that she has completely recovered from the recent operation.  Her ECOG PS is 1.  She denies any anxiety, depression, easy bruising, fevers, chills, night  sweats, weight loss, nausea, vomiting, diarrhea, constipation, diplopia, blurred vision, headache, chest pain, palpitations, shortness of breath, breast pain, abdominal pain, extremity pain, or difficulty ambulating.  The remainder of the ten-point ROS, including general, skin, lymph, H/N, cardiorespiratory, GI, , Neuro, Endocrine, and psychiatric is negative.     Physical Exam       She is alert, oriented to time, place, person, pleasant, well      nourished, in no  acute physical distress.  She is accompanied by her                                     VITAL SIGNS:  Reviewed                                      HEENT:  Normal.  There are no nasal, oral, lip, gingival, auricular, lid,    or conjunctival lesions.  Mucosae are moist and pink, and there is no        thrush.  Pupils are equal, reactive to light and accommodation.              Extraocular muscle movements are intact.  Dentition is good.  There is no frontal or maxillary tenderness.                                     NECK:  Supple without JVD, adenopathy, or thyromegaly.  A thyroidectomy scar is seen.                  LUNGS:  Clear to auscultation without wheezing, rales, or rhonchi.           CARDIOVASCULAR:  Reveals an S1, S2, no murmurs, no rubs, no gallops.         ABDOMEN:  Soft, nontender, without organomegaly.  Bowel sounds are    present.                                                                     EXTREMITIES:  No cyanosis, clubbing, or edema.                               BREASTS:  Both breasts are large and pendulous.  No masses are palpated.  There is a healing axillary incision in the right axilla and a healing incision at the 6 to 7 o'clock position in the right breast.  It is healing nicely.  There are no masses palpated in either breast.                                  LYMPHATIC:  There is no cervical, axillary, or supraclavicular adenopathy.   SKIN:  Warm and moist, without petechiae, rashes, induration, or ecchymoses.           NEUROLOGIC:  DTRs are 0-1+ bilaterally, symmetrical, motor function is 5/5,  and cranial nerves are  within normal limits.    ASSESSMENT  Newly diagnosed ILC of the right breast, ER strongly positive, AR positive HER2 negative.  Pathologic stage is T1c N0 M0 and Oncotype score is 0  History of hypertension, hyperlipidemia       PLAN  I had a long discussion with Mrs. Dailey.  I explained to her that no chemotherapy is indicated and that she has a very good  prognosis.  At this point we will proceed as follows:  1. She may proceed with radiation therapy.  I will alert Dr. Nielson  2.  Upon completion of her radiation therapy she will be started on adjuvant hormonal therapy with anastrozole.  The pros and cons of such an approach were discussed in detail.  Assuming that she will finish radiation by mid August, I will see her in late August so that she can start the anastrozole by early September      Her multiple questions were answered to her satisfaction.  I spent approximately 43 minutes reviewing the available records, counseling and coordinating her care.  Visit today included increased complexity associated with the care of her newly diagnosed [id number] ILC .

## 2024-07-24 ENCOUNTER — DOCUMENTATION ONLY (OUTPATIENT)
Dept: REHABILITATION | Facility: HOSPITAL | Age: 74
End: 2024-07-24

## 2024-07-24 ENCOUNTER — CLINICAL SUPPORT (OUTPATIENT)
Dept: REHABILITATION | Facility: HOSPITAL | Age: 74
End: 2024-07-24
Payer: MEDICARE

## 2024-07-24 ENCOUNTER — TELEPHONE (OUTPATIENT)
Dept: HEMATOLOGY/ONCOLOGY | Facility: CLINIC | Age: 74
End: 2024-07-24
Payer: MEDICARE

## 2024-07-24 ENCOUNTER — DOCUMENTATION ONLY (OUTPATIENT)
Dept: INFUSION THERAPY | Facility: HOSPITAL | Age: 74
End: 2024-07-24
Payer: MEDICARE

## 2024-07-24 DIAGNOSIS — C50.911 INVASIVE LOBULAR CARCINOMA OF RIGHT BREAST IN FEMALE: ICD-10-CM

## 2024-07-24 DIAGNOSIS — M25.519 SHOULDER PAIN, UNSPECIFIED CHRONICITY, UNSPECIFIED LATERALITY: ICD-10-CM

## 2024-07-24 PROCEDURE — 97110 THERAPEUTIC EXERCISES: CPT | Mod: PN

## 2024-07-24 PROCEDURE — 97161 PT EVAL LOW COMPLEX 20 MIN: CPT | Mod: PN

## 2024-07-24 NOTE — PROGRESS NOTES
Returned pt call regarding questions about recommended garments. Confirmed with pt brand, size and compression level per her request. Patient stating she wishes to get the garments as she is also having radiation.  Patient informed me she spoke to her insurance rep and that the garments would be covered with her secondary insurance. Patients  was on 3 way call requesting billing codes (for garments) however PT explained we are not a DME provider (and I was not familiar with the specific codes) therefore the phone number for [facility] (DME provider for garments) was provided to contact them regarding the correct codes.   Patient requested PT to fax garment orders to [facility] in [place] for ordering. Pt advised patient to contact Sharon or Margarita at [facility] in 3-4 business days if she has not received a call regarding order.   Patient thank me for assistance.

## 2024-07-24 NOTE — PATIENT INSTRUCTIONS
Garment Recommendations: (Pt requested to reprint from Prehab visit)  Sigvaris Secure upper arm sleeve in size large long length with 15-20 mmHg with silicone band at top. Sigvaris Secure gauntlet in size small with 15-20 mmHg. Please begin wearing these at 2 weeks post op for one year during the day pro-phylactically.   Good choice for bra wear is sports bra while you are undergoing cancer care.      PT will obtain orders for the garments. Please call your secondary insurance to see if they will cover. If insurance does not cover, then please contact [employer] the  to see if  you qualify for tran assistance to assist with obtaining tran for garments.  When you call your insurance, tell them this is for prevention of lymphedema since you are having a node involved with surgery and will be having radiation. This places you more at risk for lymphedema.              From todays Shoulder assessment:    General   Before starting with a program, ask your doctor if you are healthy enough to do these exercises. Your doctor may have you work with a  or physical therapist to make a safe exercise program to meet your needs.  Strengthening Exercises   Strengthening exercises keep your muscles firm and strong. Start by repeating each exercise 2 to 3 times. Work up to doing each exercise 10 times. Try to do the exercises 2 to 3 times each day. Do all exercises slowly.  Some exercises can be done holding a small weight. You can use a can of soup or a hand weight. If the exercise gets too easy, use heavier weights or do the exercise more times.  Shoulder blade squeezes ? Pinch your shoulder blades together on your upper back and hold 3 to 5 seconds. Be sure you are sitting with good posture and make sure your shoulders do not raise up when you do this exercise. Relax.  Shoulder circles ? Sit with your back straight. Raise just your shoulders up towards your ears. Move them back, down, and then forward in a  [place]. Repeat, but now move your shoulders in a [place] going forward.

## 2024-07-24 NOTE — TELEPHONE ENCOUNTER
Pt called the office to speak with  PT Sherie, forwarded the message to her so that she can call the pt to answer her questions regarding the compression sleeve.    ----- Message from Rosalind Hall sent at 7/24/2024  4:18 PM CDT -----  Type: Needs Medical Advice  Who Called:  Patient   Symptoms (please be specific):    How long has patient had these symptoms:    Pharmacy name and phone #:    Best Call Back Number: [phone]  Additional Information: Patient is requesting a call back from Ms. Golden regarding sizing on sleeve.
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
## 2024-07-24 NOTE — PROGRESS NOTES
4:23 PM      This LCSW called this pt to see if she needs assistance with lymphedema garments. The pt said she spoke to her insurance and they will cover the cost of the garments, so she does not need assistance.  The pt said she appreciated me calling. She had specific questions about the garment. This  explained that is something out of my scope of practice, but I would message WASHINGTON Rehman and ask her reach out to this pt.  She thanked me for calling today.

## 2024-07-25 DIAGNOSIS — M25.519 SHOULDER PAIN, UNSPECIFIED CHRONICITY, UNSPECIFIED LATERALITY: ICD-10-CM

## 2024-07-25 DIAGNOSIS — C50.911 INVASIVE LOBULAR CARCINOMA OF RIGHT BREAST IN FEMALE: Primary | ICD-10-CM

## 2024-07-26 ENCOUNTER — TELEPHONE (OUTPATIENT)
Dept: HEMATOLOGY/ONCOLOGY | Facility: CLINIC | Age: 74
End: 2024-07-26
Payer: MEDICARE

## 2024-07-26 NOTE — TELEPHONE ENCOUNTER
Called pt to get her scheduled for PT apts; no ans left voicemail for pt to call me back to schedule.

## 2024-07-26 NOTE — TELEPHONE ENCOUNTER
Pt returned my call and asked me to cancel all future apts with PT. She stated that she spoke with her PCP and she does not need to come back for PT for her shoulder and that she is fine after her breast cancer surgery and does not need a Posthab apt. I sent a message to inform her surgeon of the pt's decisions

## 2024-07-30 ENCOUNTER — HOSPITAL ENCOUNTER (OUTPATIENT)
Dept: RADIATION THERAPY | Facility: HOSPITAL | Age: 74
Discharge: HOME | End: 2024-07-30
Attending: RADIOLOGY
Payer: MEDICARE

## 2024-07-30 ENCOUNTER — TELEPHONE (OUTPATIENT)
Dept: INFUSION THERAPY | Facility: HOSPITAL | Age: 74
End: 2024-07-30
Payer: MEDICARE

## 2024-07-30 PROCEDURE — 77014 HC CT GUIDANCE RADIATION THERAPY FLDS PLACEMENT: CPT | Mod: TC,PN | Performed by: RADIOLOGY

## 2024-07-30 PROCEDURE — 77263 THER RADIOLOGY TX PLNG CPLX: CPT | Mod: ,,, | Performed by: RADIOLOGY

## 2024-07-30 PROCEDURE — 77334 RADIATION TREATMENT AID(S): CPT | Mod: 26,,, | Performed by: RADIOLOGY

## 2024-07-30 PROCEDURE — 77290 THER RAD SIMULAJ FIELD CPLX: CPT | Mod: TC,PN | Performed by: RADIOLOGY

## 2024-07-30 PROCEDURE — 77290 THER RAD SIMULAJ FIELD CPLX: CPT | Mod: 26,,, | Performed by: RADIOLOGY

## 2024-07-30 PROCEDURE — 77334 RADIATION TREATMENT AID(S): CPT | Mod: TC,PN | Performed by: RADIOLOGY
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
## 2024-08-01 ENCOUNTER — HOSPITAL ENCOUNTER (OUTPATIENT)
Dept: RADIATION THERAPY | Facility: HOSPITAL | Age: 74
Discharge: HOME | End: 2024-08-01
Attending: RADIOLOGY
Payer: MEDICARE

## 2024-08-05 PROCEDURE — 77295 3-D RADIOTHERAPY PLAN: CPT | Mod: 26,,, | Performed by: RADIOLOGY

## 2024-08-05 PROCEDURE — 77295 3-D RADIOTHERAPY PLAN: CPT | Mod: TC,PN | Performed by: RADIOLOGY

## 2024-08-05 PROCEDURE — 77334 RADIATION TREATMENT AID(S): CPT | Mod: 26,,, | Performed by: RADIOLOGY

## 2024-08-05 PROCEDURE — 77300 RADIATION THERAPY DOSE PLAN: CPT | Mod: TC,PN | Performed by: RADIOLOGY

## 2024-08-05 PROCEDURE — 77300 RADIATION THERAPY DOSE PLAN: CPT | Mod: 26,,, | Performed by: RADIOLOGY

## 2024-08-05 PROCEDURE — 77334 RADIATION TREATMENT AID(S): CPT | Mod: TC,PN | Performed by: RADIOLOGY

## 2024-08-06 ENCOUNTER — DOCUMENTATION ONLY (OUTPATIENT)
Dept: INFUSION THERAPY | Facility: HOSPITAL | Age: 74
End: 2024-08-06
Payer: MEDICARE

## 2024-08-06 ENCOUNTER — DOCUMENTATION ONLY (OUTPATIENT)
Dept: RADIATION ONCOLOGY | Facility: CLINIC | Age: 74
End: 2024-08-06
Payer: MEDICARE

## 2024-08-06 PROCEDURE — 77412 RADIATION TX DELIVERY LVL 3: CPT | Mod: PN | Performed by: RADIOLOGY

## 2024-08-06 PROCEDURE — 77427 RADIATION TX MANAGEMENT X5: CPT | Mod: ,,, | Performed by: RADIOLOGY

## 2024-08-06 PROCEDURE — 77387 GUIDANCE FOR RADJ TX DLVR: CPT | Mod: TC,PN | Performed by: RADIOLOGY

## 2024-08-06 PROCEDURE — G6002 STEREOSCOPIC X-RAY GUIDANCE: HCPCS | Mod: 26,,, | Performed by: RADIOLOGY

## 2024-08-06 PROCEDURE — 77417 THER RADIOLOGY PORT IMAGE(S): CPT | Mod: PN | Performed by: RADIOLOGY

## 2024-08-07 PROCEDURE — G6002 STEREOSCOPIC X-RAY GUIDANCE: HCPCS | Mod: 26,,, | Performed by: RADIOLOGY

## 2024-08-07 PROCEDURE — 77412 RADIATION TX DELIVERY LVL 3: CPT | Mod: PN | Performed by: RADIOLOGY

## 2024-08-07 PROCEDURE — 77387 GUIDANCE FOR RADJ TX DLVR: CPT | Mod: TC,PN | Performed by: RADIOLOGY

## 2024-08-08 ENCOUNTER — DOCUMENTATION ONLY (OUTPATIENT)
Dept: RADIATION ONCOLOGY | Facility: CLINIC | Age: 74
End: 2024-08-08
Payer: MEDICARE

## 2024-08-08 PROCEDURE — 77387 GUIDANCE FOR RADJ TX DLVR: CPT | Mod: TC,PN | Performed by: RADIOLOGY

## 2024-08-08 PROCEDURE — 77412 RADIATION TX DELIVERY LVL 3: CPT | Mod: PN | Performed by: RADIOLOGY

## 2024-08-08 PROCEDURE — G6002 STEREOSCOPIC X-RAY GUIDANCE: HCPCS | Mod: 26,,, | Performed by: RADIOLOGY

## 2024-08-09 ENCOUNTER — DOCUMENTATION ONLY (OUTPATIENT)
Dept: INFUSION THERAPY | Facility: HOSPITAL | Age: 74
End: 2024-08-09
Payer: MEDICARE

## 2024-08-09 PROCEDURE — G6002 STEREOSCOPIC X-RAY GUIDANCE: HCPCS | Mod: 26,,, | Performed by: RADIOLOGY

## 2024-08-09 PROCEDURE — 77387 GUIDANCE FOR RADJ TX DLVR: CPT | Mod: TC,PN | Performed by: RADIOLOGY

## 2024-08-09 PROCEDURE — 77412 RADIATION TX DELIVERY LVL 3: CPT | Mod: PN | Performed by: RADIOLOGY

## 2024-08-12 PROCEDURE — G6002 STEREOSCOPIC X-RAY GUIDANCE: HCPCS | Mod: 26,,, | Performed by: RADIOLOGY

## 2024-08-12 PROCEDURE — 77412 RADIATION TX DELIVERY LVL 3: CPT | Mod: PN | Performed by: RADIOLOGY

## 2024-08-12 PROCEDURE — 77336 RADIATION PHYSICS CONSULT: CPT | Mod: PN | Performed by: RADIOLOGY

## 2024-08-12 PROCEDURE — 77387 GUIDANCE FOR RADJ TX DLVR: CPT | Mod: TC,PN | Performed by: RADIOLOGY

## 2024-08-13 ENCOUNTER — DOCUMENTATION ONLY (OUTPATIENT)
Dept: RADIATION ONCOLOGY | Facility: CLINIC | Age: 74
End: 2024-08-13
Payer: MEDICARE

## 2024-08-13 PROCEDURE — G6002 STEREOSCOPIC X-RAY GUIDANCE: HCPCS | Mod: 26,,, | Performed by: RADIOLOGY

## 2024-08-13 PROCEDURE — 77387 GUIDANCE FOR RADJ TX DLVR: CPT | Mod: TC,PN | Performed by: RADIOLOGY

## 2024-08-13 PROCEDURE — 77417 THER RADIOLOGY PORT IMAGE(S): CPT | Mod: PN | Performed by: RADIOLOGY

## 2024-08-13 PROCEDURE — 77412 RADIATION TX DELIVERY LVL 3: CPT | Mod: PN | Performed by: RADIOLOGY

## 2024-08-13 NOTE — PLAN OF CARE
Completed 5 of 15 outpatient radiation treatments to the breast without difficulty.  No problems noted.  All questions and concerns addressed with MD this visit.

## 2024-08-14 PROCEDURE — 77412 RADIATION TX DELIVERY LVL 3: CPT | Mod: PN | Performed by: RADIOLOGY

## 2024-08-14 PROCEDURE — G6002 STEREOSCOPIC X-RAY GUIDANCE: HCPCS | Mod: 26,,, | Performed by: RADIOLOGY

## 2024-08-14 PROCEDURE — 77387 GUIDANCE FOR RADJ TX DLVR: CPT | Mod: TC,PN | Performed by: RADIOLOGY

## 2024-08-16 ENCOUNTER — DOCUMENTATION ONLY (OUTPATIENT)
Dept: INFUSION THERAPY | Facility: HOSPITAL | Age: 74
End: 2024-08-16
Payer: MEDICARE
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
## 2024-08-22 ENCOUNTER — DOCUMENTATION ONLY (OUTPATIENT)
Dept: RADIATION ONCOLOGY | Facility: CLINIC | Age: 74
End: 2024-08-22
Payer: MEDICARE

## 2024-08-27 ENCOUNTER — DOCUMENTATION ONLY (OUTPATIENT)
Dept: RADIATION ONCOLOGY | Facility: CLINIC | Age: 74
End: 2024-08-27
Payer: MEDICARE

## 2024-08-27 NOTE — PLAN OF CARE
Patient has completed all outpatient radiation treatments to the breast this visit.  Follow-up appointment and information provided and patient verbalizes understanding.

## 2024-09-05 ENCOUNTER — OFFICE VISIT (OUTPATIENT)
Dept: HEMATOLOGY/ONCOLOGY | Facility: CLINIC | Age: 74
End: 2024-09-05
Payer: MEDICARE

## 2024-09-05 VITALS
HEART RATE: 75 BPM | HEIGHT: 66 IN | BODY MASS INDEX: 26.96 KG/M2 | RESPIRATION RATE: 17 BRPM | TEMPERATURE: 97 F | DIASTOLIC BLOOD PRESSURE: 85 MMHG | WEIGHT: 167.75 LBS | OXYGEN SATURATION: 98 % | SYSTOLIC BLOOD PRESSURE: 125 MMHG

## 2024-09-05 DIAGNOSIS — Z79.811 USE OF AROMATASE INHIBITORS: Primary | ICD-10-CM

## 2024-09-05 PROCEDURE — 99215 OFFICE O/P EST HI 40 MIN: CPT | Mod: S$PBB,,, | Performed by: INTERNAL MEDICINE

## 2024-09-05 PROCEDURE — 99214 OFFICE O/P EST MOD 30 MIN: CPT | Mod: PBBFAC,PN | Performed by: INTERNAL MEDICINE

## 2024-09-05 PROCEDURE — 99999 PR PBB SHADOW E&M-EST. PATIENT-LVL IV: CPT | Mod: PBBFAC,,, | Performed by: INTERNAL MEDICINE

## 2024-09-05 RX ORDER — ANASTROZOLE 1 MG/1
1 TABLET ORAL DAILY
Qty: 90 TABLET | Refills: 2 | Status: SHIPPED | OUTPATIENT
Start: 2024-09-05 | End: 2025-09-05
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
## 2024-10-07 ENCOUNTER — TELEPHONE (OUTPATIENT)
Dept: HEMATOLOGY/ONCOLOGY | Facility: CLINIC | Age: 74
End: 2024-10-07
Payer: MEDICARE

## 2024-10-07 NOTE — TELEPHONE ENCOUNTER
Spoke with the pt and got the pt rescheduled. Pt verbalized and understanding.  ----- Message from [place] sent at 10/7/2024 10:50 AM CDT -----  Type: Needs Medical Advice  Who Called:  Patient   Symptoms (please be specific):    How long has patient had these symptoms:    Pharmacy name and phone #:    Best Call Back Number: [phone]  Additional Information: Patient is requesting a  call back to reschedule her appt. on 10/15.

## 2024-10-21 ENCOUNTER — OFFICE VISIT (OUTPATIENT)
Dept: HEMATOLOGY/ONCOLOGY | Facility: CLINIC | Age: 74
End: 2024-10-21
Payer: MEDICARE

## 2024-10-21 VITALS
RESPIRATION RATE: 17 BRPM | HEIGHT: 66 IN | OXYGEN SATURATION: 99 % | TEMPERATURE: 97 F | WEIGHT: 155.19 LBS | HEART RATE: 78 BPM | SYSTOLIC BLOOD PRESSURE: 115 MMHG | BODY MASS INDEX: 24.94 KG/M2 | DIASTOLIC BLOOD PRESSURE: 66 MMHG

## 2024-10-21 DIAGNOSIS — Z79.811 USE OF AROMATASE INHIBITORS: ICD-10-CM

## 2024-10-21 DIAGNOSIS — C50.911 INVASIVE LOBULAR CARCINOMA OF RIGHT BREAST IN FEMALE: Primary | ICD-10-CM

## 2024-10-21 PROCEDURE — G2211 COMPLEX E/M VISIT ADD ON: HCPCS | Mod: S$PBB,,,

## 2024-10-21 PROCEDURE — 99999 PR PBB SHADOW E&M-EST. PATIENT-LVL III: CPT | Mod: PBBFAC,,,

## 2024-10-21 PROCEDURE — 99213 OFFICE O/P EST LOW 20 MIN: CPT | Mod: PBBFAC,PN

## 2024-10-21 PROCEDURE — 99214 OFFICE O/P EST MOD 30 MIN: CPT | Mod: S$PBB,,,

## 2024-10-21 NOTE — PROGRESS NOTES
PATIENT: Mara Dailey  MRN: [id number]  DATE: 10/21/2024      Diagnosis:   1. Invasive lobular carcinoma of right breast in female    2. Use of aromatase inhibitors        Chief Complaint: Follow-up (mid oct f/u/)          Subjective:    Interval History: Ms. Dailey is a 74 y.o. female who returns for follow up for breast cancer. She is Status post  Right lumpectomy, right sentinel node biopsy on 06/26/24. She Completed XRT on 08/2724. She was started on Arimidex on 9/20/24. She reports tolerating Arimidex well. She has had changes to taste affecting her intake since radiation treatment. She is able to tolerate a regular diet. She supplements intake with protein shakes occasionally.  She denies changes to bowel habits, abdominal pain. Denies fever, chills, sob, cp, palpitations, swelling, fatigue, numbness, tingling, n/v, diarrhea, constipation, abdominal pain, new bumps, lumps, bleeding, bruising.     Oncologic History:   Per Record:   On June 26, 2924 she underwent a lumpectomy and sentinel lymph node biopsy   The pathology report indicates she had a 1.8 cm invasive ductal carcinoma along with DCIS.  Resection margins were clear while 2 sentinel lymph nodes were negative.  The Oncotype score is 0 indicating a 97% chance of disease-free survival at 9 years with aromatase inhibitors  She has now completed her radiation therapy and she is here to discuss further treatment options.     Briefly, she  is a 73-year-old female with a recent diagnosis of infiltrating lobular carcinoma referred for initial evaluation.  A screening mammogram on 03/28/2024 showed an asymmetry in the lower outer quadrant of the right breast.  A mammogram and ultrasound on April 16, 2024 showed a 1.5 cm x 0.8 cm x 1.1 cm irregularly shaped hypoechoic mass.  No adenopathy was seen  A biopsy was performed 3 days later and showed a carcinoma that was ER strongly positive, AR strongly positive and HER2 2+ but negative by FISH  As mentioned  above, she had a lumpectomy and sentinel lymph node biopsy on June 26, 2024 with results as above.   Oncology History   Invasive lobular carcinoma of right breast in female   5/30/2024 Initial Diagnosis    Invasive lobular carcinoma of right breast in female     5/30/2024 Cancer Staged    Staging form: Breast, AJCC 8th Edition  - Clinical stage from 5/30/2024: Stage IA (cT1c, cN0, cM0, G2, ER+, AR+, HER2-)     7/3/2024 Cancer Staged    Staging form: Breast, AJCC 8th Edition  - Pathologic stage from 7/3/2024: Stage IA (pT1c, pN0(sn), cM0, G2, ER+, AR+, HER2-, Oncotype DX score: 0)     8/6/2024 - 8/27/2024 Radiation Therapy    Treating physician: Marina Nielson  Total Dose: 40.05 Gy  Fractions: 15  Treatment Site Ref. ID Energy Dose/Fx (Gy) #Fx Dose Correction (Gy) Total Dose (Gy) Start Date End Date Elapsed Days   3D Breast R PTV1 18X/6X 2.67 15 / 15 0 40.05 8/6/2024 8/27/2024 21            Past Medical History:   Past Medical History:   Diagnosis Date    Enlargement of thyroid 10/2023    Hiatal hernia with obstruction but no gangrene 04/2018    Hyperlipidemia 04/2018    Hypertension     PONV (postoperative nausea and vomiting)     Reflux esophagitis 04/2018    Unspecified glaucoma        Past Surgical HIstory:   Past Surgical History:   Procedure Laterality Date    AXILLARY HIDRADENITIS EXCISION Right 1980    BREAST BIOPSY Left 2011/2012    benign needle biopsy, done in [place]    BREAST CYST ASPIRATION Right     CATARACT EXTRACTION W/ INTRAOCULAR LENS IMPLANT Left     CATARACT EXTRACTION W/ INTRAOCULAR LENS IMPLANT Right     HYSTERECTOMY  2004    LAPAROSCOPIC REPAIR OF HIATAL HERNIA      LUMPECTOMY, WITH RADAR LOCALIZATION USING YASMIN  Right 6/26/2024    Procedure: LUMPECTOMY,WITH RADAR LOCALIZATION USING YASMIN ;  Surgeon: Jessica Hoffman MD;  Location: [facility];  Service: General;  Laterality: Right;    SENTINEL LYMPH NODE BIOPSY Right 6/26/2024    Procedure: BIOPSY, LYMPH NODE, SENTINEL;  Surgeon:  Jessica Hoffman MD;  Location: [facility];  Service: General;  Laterality: Right;    THYROID LOBECTOMY Right 10/13/2023    Procedure: LOBECTOMY, THYROID;  Surgeon: Endy Ibarra MD;  Location: [facility];  Service: General;  Laterality: Right;       Family History:   Family History   Problem Relation Name Age of Onset    Heart disease Mother      Kidney disease Mother      Diabetes Mother      Hypertension Mother      Cancer Father          lung       Social History:  reports that she has never smoked. She has never used smokeless tobacco. She reports that she does not drink alcohol and does not use drugs.    Allergies:  Review of patient's allergies indicates:   Allergen Reactions    Grass pollen-lnadon grass standard        Medications:  Current Outpatient Medications   Medication Sig Dispense Refill    anastrozole (ARIMIDEX) 1 mg Tab Take 1 tablet (1 mg total) by mouth once daily. 90 tablet 2    calcium carbonate/vitamin D3 (VITAMIN D-3 ORAL) Take 1 tablet by mouth every other day.      cyanocobalamin, vitamin B-12, (VITAMIN B-12 ORAL) Take 1 tablet by mouth every other day.      krill/om-3/dha/epa/phospho/ast (MAXIMUM RED KRILL OMEGA-3 ORAL) Take 1 tablet by mouth every other day.      latanoprost 0.005 % ophthalmic solution Place 1 drop into both eyes every evening.      losartan (COZAAR) 100 MG tablet Take 100 mg by mouth.      simvastatin (ZOCOR) 40 MG tablet Take 40 mg by mouth every evening.      valsartan-hydrochlorothiazide (DIOVAN-HCT) 320-25 mg per tablet Take 1 tablet by mouth once daily.       No current facility-administered medications for this visit.       Review of Systems   Constitutional:  Positive for appetite change and unexpected weight change.   HENT:  Negative for mouth sores.    Eyes:  Negative for visual disturbance.   Respiratory:  Negative for cough and shortness of breath.    Cardiovascular:  Negative for chest pain.   Gastrointestinal:  Negative for abdominal pain and diarrhea.  "  Genitourinary:  Negative for frequency.   Musculoskeletal:  Negative for back pain.   Skin:  Negative for rash.   Neurological:  Negative for headaches.   Hematological:  Negative for adenopathy.   Psychiatric/Behavioral:  The patient is not nervous/anxious.        ECOG Performance Status:   ECOG SCORE             Objective:      Vitals:   Vitals:    10/21/24 1110   BP: 115/66   BP Location: Right arm   Patient Position: Sitting   Pulse: 78   Resp: 17   Temp: 97.1 °F (36.2 °C)   TempSrc: Temporal   SpO2: 99%   Weight: 70.4 kg (155 lb 3.3 oz)   Height: 5' 6" (1.676 m)     BMI: Body mass index is 25.05 kg/m².    Physical Exam  HENT:      Head: Normocephalic.      Nose: Nose normal.      Mouth/Throat:      Mouth: Mucous membranes are moist.      Pharynx: Oropharynx is clear.   Eyes:      Pupils: Pupils are equal, round, and reactive to light.   Cardiovascular:      Rate and Rhythm: Normal rate and regular rhythm.      Heart sounds: Normal heart sounds.   Pulmonary:      Effort: Pulmonary effort is normal.      Breath sounds: Normal breath sounds.   Abdominal:      General: Bowel sounds are normal.   Musculoskeletal:         General: Normal range of motion.      Cervical back: Normal range of motion.   Skin:     General: Skin is warm and dry.   Neurological:      Mental Status: She is alert and oriented to person, place, and time.   Psychiatric:         Mood and Affect: Mood normal.         Behavior: Behavior normal.         Laboratory Data:  No results found for this or any previous visit (from the past 24 hours).       Imaging: DXA BONE DENSITY AXIAL SKELETON 1 OR MORE SITES     CLINICAL HISTORY:  Age-related osteoporosis without current pathological fracture     TECHNIQUE:  DXA scanning was performed over the bilateral hips and lumbar spine.  Review of the images confirms satisfactory positioning and technique.     COMPARISON:  02/14/2023     FINDINGS:  The L1 to L4 vertebral bone mineral density is equal to 1.192 " g/cm squared with a T score of 0.0.     There has been no significant change in bone mineral density in the spine relative to the prior study.     The left femoral neck bone mineral density is equal to 0.919 g/cm squared with a T score of -0.9.     The total hip bone mineral density is equal to 0.993 g/cm squared with a T score of -0.1.     There has been no significant change in the bone mineral density in the hips relative to the prior study.     There is a 3.8% risk of a major osteoporotic fracture and a 0.4% risk of hip fracture in the next 10 years (FRAX).     Impression:     Findings consistent with normal bone mineralization with similar bone mineralization compared to prior study from 02/14/2023 and a 10-year 3.8% probability of major osteoporotic fracture and 0.4% probability of hip fracture based on the FRAX model.     Consider FDA approved medical therapies in postmenopausal women and men aged 50 years and older, based on the following:     *A hip or vertebral (clinical or morphometric) fracture  *T score less than or equal to -2.5 at the femoral neck or spine after appropriate evaluation to exclude secondary causes.  *Low bone mass -- also known as osteopenia (T score between -1.0 and -2.5 at the femoral neck or spine) and a 10 year probability of hip fracture greater than or equal to 3% or a 10 year probability of major osteoporosis-related fracture greater than or equal to 20% based on the [place]-adapted WHO algorithm.  *Clinicians judgment and/or patient preference may indicate treatment for people with 10 year fracture probabilities is above or below these levels.        Electronically signed by:Manuel Morrissey  Date:                                            03/28/2024   Assessment:       1. Invasive lobular carcinoma of right breast in female    2. Use of aromatase inhibitors           Plan:     Mrs Dailey is tolerating Arimidex well.    She will remain on anastrazole for a minimum of 5 years through  the beginning of September 20, 2029.   I reinforced that there is no need for routine lab work or routine radiologic studies other than a yearly mammogram and a bone density scan.    Bone density scan in March revealed normal bone density.  6 kg weight loss noted since previous clinic visit. Pt reports changes to taste buds, existing prior to the start of Arimidex have affected her appetite. She declines nutrition consult today. States that she and her  are working to improve intake.      Visit today included increased complexity associated with the care of her newly diagnosed [id number] ILC .        Med Onc Chart Routing      Follow up with physician 6 weeks. with Dr Madera before the end of the year without labs.   Follow up with TOMMY    Infusion scheduling note    Injection scheduling note    Labs    Imaging    Pharmacy appointment    Other referrals                  Plan was discussed with the patient at length, and she verbalized understanding. Mara was given an opportunity to ask questions that were answered to her satisfaction, and she was advised to call in the interval if any problems or questions arise.    Assessment/Plan reviewed and approved by Dr Madera    30 minutes were spent in coordination of patient's care, record review and counseling.    JING Richardson, FNP-C  Hematology & Oncology

## 2024-12-27 ENCOUNTER — OFFICE VISIT (OUTPATIENT)
Dept: HEMATOLOGY/ONCOLOGY | Facility: CLINIC | Age: 74
End: 2024-12-27
Payer: MEDICARE

## 2024-12-27 VITALS
WEIGHT: 149.69 LBS | SYSTOLIC BLOOD PRESSURE: 125 MMHG | OXYGEN SATURATION: 95 % | BODY MASS INDEX: 24.06 KG/M2 | RESPIRATION RATE: 17 BRPM | TEMPERATURE: 97 F | DIASTOLIC BLOOD PRESSURE: 72 MMHG | HEART RATE: 61 BPM | HEIGHT: 66 IN

## 2024-12-27 DIAGNOSIS — C50.911 INVASIVE LOBULAR CARCINOMA OF RIGHT BREAST IN FEMALE: Primary | ICD-10-CM

## 2024-12-27 PROCEDURE — 99213 OFFICE O/P EST LOW 20 MIN: CPT | Mod: PBBFAC,PN | Performed by: INTERNAL MEDICINE

## 2024-12-27 PROCEDURE — 99999 PR PBB SHADOW E&M-EST. PATIENT-LVL III: CPT | Mod: PBBFAC,,, | Performed by: INTERNAL MEDICINE
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
## 2025-02-20 ENCOUNTER — OFFICE VISIT (OUTPATIENT)
Dept: RADIATION ONCOLOGY | Facility: CLINIC | Age: 75
End: 2025-02-20
Payer: MEDICARE

## 2025-02-20 VITALS
TEMPERATURE: 98 F | WEIGHT: 150.81 LBS | HEIGHT: 66 IN | OXYGEN SATURATION: 97 % | SYSTOLIC BLOOD PRESSURE: 107 MMHG | DIASTOLIC BLOOD PRESSURE: 64 MMHG | BODY MASS INDEX: 24.24 KG/M2 | RESPIRATION RATE: 16 BRPM | HEART RATE: 84 BPM

## 2025-02-20 DIAGNOSIS — C50.911 INVASIVE LOBULAR CARCINOMA OF RIGHT BREAST IN FEMALE: Primary | ICD-10-CM

## 2025-02-20 PROCEDURE — 99214 OFFICE O/P EST MOD 30 MIN: CPT | Mod: PBBFAC,PN | Performed by: RADIOLOGY

## 2025-02-20 NOTE — PROGRESS NOTES
02/20/2025    [facility]  Department of Radiation Oncology  [facility]  Follow Up Visit Note    Diagnosis:  Mara Dailey is a 74 y.o. female with a(n)  Stage IA (pT1c, N0, M0, Grade 2, ER+/OH+/HER2-) Invasive lobular carcinoma of the RIGHT breast, status post lumpectomy and adjuvant radiation therapy.     Oncologic History:  Oncology History   Invasive lobular carcinoma of right breast in female   5/30/2024 Initial Diagnosis    Invasive lobular carcinoma of right breast in female     5/30/2024 Cancer Staged    Staging form: Breast, AJCC 8th Edition  - Clinical stage from 5/30/2024: Stage IA (cT1c, cN0, cM0, G2, ER+, OH+, HER2-)     7/3/2024 Cancer Staged    Staging form: Breast, AJCC 8th Edition  - Pathologic stage from 7/3/2024: Stage IA (pT1c, pN0(sn), cM0, G2, ER+, OH+, HER2-, Oncotype DX score: 0)     8/6/2024 - 8/27/2024 Radiation Therapy    Treating physician: Marina Nielson  Total Dose: 40.05 Gy  Fractions: 15  Treatment Site Ref. ID Energy Dose/Fx (Gy) #Fx Dose Correction (Gy) Total Dose (Gy) Start Date End Date Elapsed Days   3D Breast R PTV1 18X/6X 2.67 15 / 15 0 40.05 8/6/2024 8/27/2024 21             Interval History  The patient presents today for a regularly scheduled follow up visit.  She was last seen in our clinic on 8/27/24 at completion of therapy.   Since that time, notes some increased breast firmness and feels hot.  Some hot flashes on Arimidex.     2/17/25 mammogram:  BI-RADS 2    HPI: The patient is a 73 y.o. year old female with a new diagnosis of breast cancer. She initially presented due to abnormal mammogram.      3/28/24 Screening mammogram:  right 1.4cm focal asymmetry in lower outer quadrant posterior     4/16/24 Right Diagnostic mammogram/ultrasound:   1.4cm high density oval mass at 7:00 8cmfn  By u/s: 1.5cm irregularly shaped hypoechoic mass at 7:00 8cmfn     4/19/24 right 7:00 mass biopsy: invasive lobular carcinoma, G2, +/+/equiv (- by FISH), Ki67  "11%     5/29/24 (in system as 6/3/24) MRI Breasts: Right 2.5 x 1.5 x 1.4cm mass posterior 7:00 8cmfn     Possibility of pregnancy: No  History of prior irradiation: No  History of prior systemic anti-cancer therapy: No  History of collagen vascular disease: No  Implanted electronic device (pacer/defib/nerve stimulator): No    Review of Systems   Review of Systems   Constitutional:  Positive for diaphoresis. Negative for chills and fever.   Eyes:  Negative for double vision.   Respiratory:  Negative for cough and shortness of breath.    Cardiovascular:  Negative for chest pain.   Gastrointestinal:  Negative for nausea and vomiting.   Neurological:  Negative for dizziness and headaches.       Social History:  Social History[1]    Family History:  Cancer-related family history includes Cancer in her father.    Exam:  Vitals:    02/20/25 1304   BP: 107/64   Pulse: 84   Resp: 16   Temp: 98.2 °F (36.8 °C)   SpO2: 97%   Weight: 68.4 kg (150 lb 12.7 oz)   Height: 5' 6" (1.676 m)     Constitutional: Pleasant 74 y.o. female in no acute distress.  Well nourished. Well groomed.   HEENT: Normocephalic and atraumatic   Lungs: No audible wheezing.  Normal effort.   Breast/Chest wall: Treated area with minimal hyperpigmented skin changes, no desquamation, no suspicious masses, no regional lymphadenopathy; dependent edema in right breast; no masses  Musculoskeletal: No gross MSK deformities. Ambulates   Skin: No rashes appreciated.   Psych: Alert and oriented with appropriate mood and affect.  Neuro:   Grossly normal.      Assessment:  Recovering from acute radiation therapy-related changes well  No evidence of disease persistence or recurrence in treated region  ECOG: (0) Fully active, able to carry on all predisease performance without restriction    Plan:  Skincare/sun protection reviewed  Endocrine therapy per Med Onc  Aware PT possible for chronic edema  She was given our contact information, and she was told that she could " call our clinic at anytime if she has any questions or concerns.  Follow up with other providers as directed         [1]   Social History  Tobacco Use    Smoking status: Never    Smokeless tobacco: Never   Substance Use Topics    Alcohol use: No    Drug use: No

## 2025-03-14 ENCOUNTER — TELEPHONE (OUTPATIENT)
Dept: HEMATOLOGY/ONCOLOGY | Facility: CLINIC | Age: 75
End: 2025-03-14
Payer: MEDICARE

## 2025-03-14 NOTE — TELEPHONE ENCOUNTER
----- Message from MILKA Rossi sent at 3/13/2025  5:00 PM CDT -----    ----- Message -----  From: Brandy Kellogg  Sent: 3/13/2025   3:56 PM CDT  To: Santy Mc Staff    Type: Needs Medical AdviceWho Called:  ptBest Call Back Number: [phone] Additional Information: requesting a call back about stopping meds for surgery on 3/18 and Pt also needs to r/s 3/21

## 2025-03-24 DIAGNOSIS — Z79.811 USE OF AROMATASE INHIBITORS: ICD-10-CM

## 2025-03-24 RX ORDER — ANASTROZOLE 1 MG/1
1 TABLET ORAL DAILY
Qty: 90 TABLET | Refills: 2 | Status: SHIPPED | OUTPATIENT
Start: 2025-03-24 | End: 2026-03-24

## 2025-03-24 NOTE — TELEPHONE ENCOUNTER
----- Message from Kip sent at 3/24/2025 10:53 AM CDT -----  Type:  RX Refill RequestWho Called:  pt Refill or New Rx:  refillRX Name and Strength:  anastrozole (ARIMIDEX) 1 mg TabHow is the patient currently taking it? (ex. 1XDay):  Is this a 30 day or 90 day RX:  Preferred Pharmacy with phone number: [facility] - [facility] AT [facility] to [facility] Local or Mail Order:  mailOrdering Provider:  Dr Waters Call Back Number:  [phone] Information: pt is saying she 4 pills left and is requesting a call back  ----- Message -----  From: Kip Chauhan  Sent: 3/24/2025  10:55 AM CDT  To: Santy Mc Staff    Type:  RX Refill RequestWho Called:  pt Refill or New Rx:  refillRX Name and Strength:  anastrozole (ARIMIDEX) 1 mg TabHow is the patient currently taking it? (ex. 1XDay):  Is this a 30 day or 90 day RX:  Preferred Pharmacy with phone number: [facility] - [facility] AT [facility] to [facility] Local or Mail Order:  mailOrdering Provider:  Dr Waters Call Back Number:  [phone] Information:

## 2025-05-01 ENCOUNTER — TELEPHONE (OUTPATIENT)
Dept: HEMATOLOGY/ONCOLOGY | Facility: CLINIC | Age: 75
End: 2025-05-01
Payer: MEDICARE

## 2025-05-01 NOTE — TELEPHONE ENCOUNTER
----- Message from Alberta sent at 5/1/2025  1:42 PM CDT -----  Type: Needs Medical AdviceWho Called:  Patient Symptoms (please be specific):  How long has patient had these symptoms:  Pharmacy name and phone #:  Best Call Back Number: [phone] Information: Patient called to inform she would like to keep her appt. on 5/8 at 8:00.

## 2025-05-01 NOTE — TELEPHONE ENCOUNTER
----- Message from Kip sent at 5/1/2025  1:37 PM CDT -----  Type: Needs Medical AdviceWho Called:  pt Symptoms (please be specific):  How long has patient had these symptoms:  Pharmacy name and phone #:  Best Call Back Number:  [phone] Information: pt is requesting a call back from nurse regarding her appt on 5/8

## 2025-05-08 ENCOUNTER — OFFICE VISIT (OUTPATIENT)
Dept: HEMATOLOGY/ONCOLOGY | Facility: CLINIC | Age: 75
End: 2025-05-08
Payer: MEDICARE

## 2025-05-08 VITALS
OXYGEN SATURATION: 99 % | HEART RATE: 67 BPM | BODY MASS INDEX: 23.34 KG/M2 | SYSTOLIC BLOOD PRESSURE: 119 MMHG | WEIGHT: 144.63 LBS | TEMPERATURE: 98 F | DIASTOLIC BLOOD PRESSURE: 72 MMHG

## 2025-05-08 DIAGNOSIS — C50.911 INVASIVE LOBULAR CARCINOMA OF RIGHT BREAST IN FEMALE: Primary | ICD-10-CM

## 2025-05-08 DIAGNOSIS — Z79.811 PROPHYLACTIC USE OF ANASTROZOLE (ARIMIDEX): ICD-10-CM

## 2025-05-08 PROCEDURE — 99213 OFFICE O/P EST LOW 20 MIN: CPT | Mod: PBBFAC,PN | Performed by: INTERNAL MEDICINE

## 2025-05-08 PROCEDURE — 99999 PR PBB SHADOW E&M-EST. PATIENT-LVL III: CPT | Mod: PBBFAC,,, | Performed by: INTERNAL MEDICINE

## 2025-05-08 RX ORDER — CEPHALEXIN 500 MG/1
500 CAPSULE ORAL 3 TIMES DAILY
COMMUNITY
Start: 2025-03-12

## 2025-05-08 RX ORDER — ASPIRIN 325 MG
50000 TABLET, DELAYED RELEASE (ENTERIC COATED) ORAL
COMMUNITY
Start: 2025-03-17

## 2025-05-08 RX ORDER — HYDROCHLOROTHIAZIDE 25 MG/1
25 TABLET ORAL DAILY
COMMUNITY
Start: 2025-04-28

## 2025-05-08 NOTE — PROGRESS NOTES
Subjective     Patient ID: Mara Dailey is a 74 y.o. female.    Chief Complaint: No chief complaint on file.    HPI    Mrs. Dailey returns today for follow-up.  I had seen her initially in June and her last visit was on late December, 2024.       Briefly, she is a 74-year-old female with a diagnosis of infiltrating lobular carcinoma.    A biopsy in April 2024 showed a carcinoma that was ER strongly positive, WI strongly positive and HER2 2+ but negative by FISH.  On June 26, 2924 she underwent a lumpectomy and sentinel lymph node biopsy   The pathology report indicates she had a 1.8 cm invasive ductal carcinoma along with DCIS.  Resection margins were clear while 2 sentinel lymph nodes were negative.  The Oncotype score is 0 indicating a 97% chance of disease-free survival at 9 years with aromatase inhibitors  Her DXA scan in March 2024 had shown normal bone density.  A mammogram in February 2025 was read as BIRADS II, and a one year follow up was recommended.      ROS:  Overall she feels well.  Since her last visit she underwent right shoulder surgery she is undergoing physical therapy.  She does complain of mild pain in the right shoulder and also of occasional hot flashes.  Despite her symptoms, her ECOG PS is 1.  She denies any anxiety, depression, easy bruising, fevers, chills, night  sweats, weight loss, nausea, vomiting, diarrhea, constipation, diplopia, blurred vision, headache, chest pain, palpitations, shortness of breath, breast pain, abdominal pain, lower extremity pain, or difficulty ambulating.  The remainder of the ten-point ROS, including general, skin, lymph, H/N, cardiorespiratory, GI, , Neuro, Endocrine, and psychiatric is negative.     Physical Exam       She is alert, oriented to time, place, person, pleasant, well      nourished, in no acute physical distress.  She is accompanied by her                                     VITAL SIGNS:  Reviewed                                       HEENT:  Normal.  There are no nasal, oral, lip, gingival, auricular, lid,    or conjunctival lesions.  Mucosae are moist and pink, and there is no        thrush.  Pupils are equal, reactive to light and accommodation.              Extraocular muscle movements are intact.  Dentition is good.  There is no frontal or maxillary tenderness.                                     NECK:  Supple without JVD, adenopathy, or thyromegaly.  A thyroidectomy scar is seen.                  LUNGS:  Clear to auscultation without wheezing, rales, or rhonchi.           CARDIOVASCULAR:  Reveals an S1, S2, no murmurs, no rubs, no gallops.         ABDOMEN:  Soft, nontender, without organomegaly.  Bowel sounds are present.                                                                     EXTREMITIES:  No cyanosis, clubbing, or edema.  She is unable to raise her right arm due to pain from the recent surgery.  Her incisions have healed nicely                              BREASTS:  Both breasts are large and pendulous.  No masses are palpated.  There is a healed axillary incision in the right axilla and a healed incision at the 6 to 7 o'clock position in the right breast.  There are no masses palpated in either breast.   There is still mild hyperpigmentation within the radiation field                               LYMPHATIC:  There is no cervical, axillary, or supraclavicular adenopathy.   SKIN:  Warm and moist, without petechiae, rashes, induration, or ecchymoses.           NEUROLOGIC:  DTRs are 0-1+ bilaterally, symmetrical, motor function is 5/5,  and cranial nerves are  within normal limits.    ASSESSMENT  Newly diagnosed ILC of the right breast, ER strongly positive, LA positive HER2 negative.  Pathologic stage is T1c N0 M0 and Oncotype score is 0  History of hypertension, hyperlipidemia         PLAN  I had a long discussion with Mrs. Dailey.   She will remain on anastrazole for a minimum of 5 years through the beginning of September  2029.   I again explained to her that there is no need for routine lab work or routine radiologic studies other than a yearly mammogram and a bone density scan.   Of note, she had a bone density scan in March that had shown normal bone density.  I will see her again in 3 months.  Her multiple questions were answered to her satisfaction.  I spent 30 minutes reviewing the available records, evaluating her, counseling her on the use anastrozole and coordinating her care.  Route Chart for Scheduling    Med Onc Chart Routing      Follow up with physician 3 months.   Follow up with TOMMY    Infusion scheduling note    Injection scheduling note    Labs    Imaging    Pharmacy appointment    Other referrals

## 2025-08-07 ENCOUNTER — OFFICE VISIT (OUTPATIENT)
Dept: HEMATOLOGY/ONCOLOGY | Facility: CLINIC | Age: 75
End: 2025-08-07
Payer: MEDICARE

## 2025-08-07 VITALS
SYSTOLIC BLOOD PRESSURE: 117 MMHG | RESPIRATION RATE: 18 BRPM | OXYGEN SATURATION: 99 % | HEIGHT: 66 IN | BODY MASS INDEX: 22.64 KG/M2 | HEART RATE: 81 BPM | WEIGHT: 140.88 LBS | DIASTOLIC BLOOD PRESSURE: 72 MMHG | TEMPERATURE: 97 F

## 2025-08-07 DIAGNOSIS — C50.911 INVASIVE LOBULAR CARCINOMA OF RIGHT BREAST IN FEMALE: Primary | ICD-10-CM

## 2025-08-07 DIAGNOSIS — Z79.811 PROPHYLACTIC USE OF ANASTROZOLE (ARIMIDEX): ICD-10-CM

## 2025-08-07 PROCEDURE — 99214 OFFICE O/P EST MOD 30 MIN: CPT | Mod: PBBFAC,PN | Performed by: NURSE PRACTITIONER

## 2025-08-07 PROCEDURE — 99999 PR PBB SHADOW E&M-EST. PATIENT-LVL IV: CPT | Mod: PBBFAC,,, | Performed by: NURSE PRACTITIONER

## 2025-08-07 NOTE — PROGRESS NOTES
PATIENT: Mara Dailey  MRN: [id number]  DATE: 8/7/2025    Diagnosis:   1. Invasive lobular carcinoma of right breast in female    2. Prophylactic use of anastrozole (Arimidex)      Chief Complaint: Breast evaluation    Subjective:    Interval History: Ms. Dailey is a 74 y.o. female who returns for follow up for breast cancer. She is status post Right lumpectomy, right sentinel node biopsy on 06/26/24. She completed XRT on 08/2724. She was started on Arimidex on 9/20/24.   Ms. Dailey is approximately 11 months post start of Anastrozole.  She endorses no side effects with Anastrozole.  Taste changes remain since XRT. She denies changes to bowel habits, abdominal pain. Denies fever, chills, mood changes, sob, cp, palpitations, swelling, fatigue, numbness, tingling, n/v, diarrhea, constipation, abdominal pain, new bumps, lumps, bleeding, bruising.     Oncologic History:   Per Record:   On June 26, 2924 she underwent a lumpectomy and sentinel lymph node biopsy   The pathology report indicates she had a 1.8 cm invasive ductal carcinoma along with DCIS.  Resection margins were clear while 2 sentinel lymph nodes were negative.  The Oncotype score is 0 indicating a 97% chance of disease-free survival at 9 years with aromatase inhibitors  She has now completed her radiation therapy and she is here to discuss further treatment options.     Briefly, she  is a 74-year-old female with a recent diagnosis of infiltrating lobular carcinoma referred for initial evaluation.  A screening mammogram on 03/28/2024 showed an asymmetry in the lower outer quadrant of the right breast.  A mammogram and ultrasound on April 16, 2024 showed a 1.5 cm x 0.8 cm x 1.1 cm irregularly shaped hypoechoic mass.  No adenopathy was seen  A biopsy was performed 3 days later and showed a carcinoma that was ER strongly positive, NV strongly positive and HER2 2+ but negative by FISH  As mentioned above, she had a lumpectomy and sentinel lymph node  biopsy on June 26, 2024 with results as above.   Oncology History   Invasive lobular carcinoma of right breast in female   5/30/2024 Initial Diagnosis    Invasive lobular carcinoma of right breast in female     5/30/2024 Cancer Staged    Staging form: Breast, AJCC 8th Edition  - Clinical stage from 5/30/2024: Stage IA (cT1c, cN0, cM0, G2, ER+, CT+, HER2-)     7/3/2024 Cancer Staged    Staging form: Breast, AJCC 8th Edition  - Pathologic stage from 7/3/2024: Stage IA (pT1c, pN0(sn), cM0, G2, ER+, CT+, HER2-, Oncotype DX score: 0)     8/6/2024 - 8/27/2024 Radiation Therapy    Treating physician: Marina Nielson  Total Dose: 40.05 Gy  Fractions: 15  Treatment Site Ref. ID Energy Dose/Fx (Gy) #Fx Dose Correction (Gy) Total Dose (Gy) Start Date End Date Elapsed Days   3D Breast R PTV1 18X/6X 2.67 15 / 15 0 40.05 8/6/2024 8/27/2024 21            Past Medical History:   Past Medical History:   Diagnosis Date    Enlargement of thyroid 10/2023    Hiatal hernia with obstruction but no gangrene 04/2018    Hyperlipidemia 04/2018    Hypertension     PONV (postoperative nausea and vomiting)     Reflux esophagitis 04/2018    Unspecified glaucoma        Past Surgical HIstory:   Past Surgical History:   Procedure Laterality Date    AXILLARY HIDRADENITIS EXCISION Right 1980    BREAST BIOPSY Left 2011/2012    benign needle biopsy, done in [place]    BREAST BIOPSY Right 04/19/2024    Invasive lobular ca    BREAST CYST ASPIRATION Right     CATARACT EXTRACTION W/ INTRAOCULAR LENS IMPLANT Left     CATARACT EXTRACTION W/ INTRAOCULAR LENS IMPLANT Right     HYSTERECTOMY  2004    LAPAROSCOPIC REPAIR OF HIATAL HERNIA      LUMPECTOMY, WITH RADAR LOCALIZATION USING YASMIN  Right 06/26/2024    Procedure: LUMPECTOMY,WITH RADAR LOCALIZATION USING YASMIN ;  Surgeon: Jessica Hoffman MD;  Location[facility];  Service: General;  Laterality: Right;    SENTINEL LYMPH NODE BIOPSY Right 06/26/2024    Procedure: BIOPSY, LYMPH NODE, SENTINEL;   Surgeon: Jessica Hoffman MD;  Location: [facility] OR;  Service: General;  Laterality: Right;    THYROID LOBECTOMY Right 10/13/2023    Procedure: LOBECTOMY, THYROID;  Surgeon: Endy Ibarra MD;  Location: [facility] OR;  Service: General;  Laterality: Right;       Family History:   Family History   Problem Relation Name Age of Onset    Heart disease Mother      Kidney disease Mother      Diabetes Mother      Hypertension Mother      Cancer Father          lung       Social History:  reports that she has never smoked. She has never used smokeless tobacco. She reports that she does not drink alcohol and does not use drugs.    Allergies:  Review of patient's allergies indicates:   Allergen Reactions    Grass pollen-june grass standard        Medications:  Current Outpatient Medications   Medication Sig Dispense Refill    anastrozole (ARIMIDEX) 1 mg Tab Take 1 tablet (1 mg total) by mouth once daily. 90 tablet 2    calcium carbonate/vitamin D3 (VITAMIN D-3 ORAL) Take 1 tablet by mouth every other day.      cephALEXin (KEFLEX) 500 MG capsule Take 500 mg by mouth 3 (three) times daily.      cholecalciferol, vitamin D3, 1,250 mcg (50,000 unit) capsule Take 50,000 Units by mouth every 7 days.      cyanocobalamin, vitamin B-12, (VITAMIN B-12 ORAL) Take 1 tablet by mouth every other day.      hydroCHLOROthiazide (HYDRODIURIL) 25 MG tablet Take 25 mg by mouth once daily.      krill/om-3/dha/epa/phospho/ast (MAXIMUM RED KRILL OMEGA-3 ORAL) Take 1 tablet by mouth every other day.      latanoprost 0.005 % ophthalmic solution Place 1 drop into both eyes every evening.      losartan (COZAAR) 100 MG tablet Take 100 mg by mouth.      simvastatin (ZOCOR) 40 MG tablet Take 40 mg by mouth every evening.      valsartan-hydrochlorothiazide (DIOVAN-HCT) 320-25 mg per tablet Take 1 tablet by mouth once daily.       No current facility-administered medications for this visit.       Review of Systems   HENT:  Negative for mouth sores.   "  Eyes:  Negative for visual disturbance.   Respiratory:  Negative for cough and shortness of breath.    Cardiovascular:  Negative for chest pain.   Gastrointestinal:  Negative for abdominal pain and diarrhea.   Genitourinary:  Negative for frequency.   Musculoskeletal:  Negative for back pain.   Skin:  Negative for rash.   Neurological:  Negative for headaches.   Hematological:  Negative for adenopathy.   Psychiatric/Behavioral:  The patient is not nervous/anxious.        Objective:      Vitals:   Vitals:    08/07/25 0835   BP: 117/72   BP Location: Left arm   Patient Position: Sitting   Pulse: 81   Resp: 18   Temp: 97.4 °F (36.3 °C)   TempSrc: Temporal   SpO2: 99%   Weight: 63.9 kg (140 lb 14 oz)   Height: 5' 6" (1.676 m)       BMI: Body mass index is 22.74 kg/m².    Physical Exam  HENT:      Head: Normocephalic.      Nose: Nose normal.      Mouth/Throat:      Mouth: Mucous membranes are moist.      Pharynx: Oropharynx is clear.   Eyes:      Pupils: Pupils are equal, round, and reactive to light.   Cardiovascular:      Rate and Rhythm: Normal rate and regular rhythm.      Heart sounds: Normal heart sounds.   Pulmonary:      Effort: Pulmonary effort is normal.      Breath sounds: Normal breath sounds.   Chest:   Breasts:     Right: No swelling, bleeding, inverted nipple, mass, nipple discharge, skin change or tenderness.      Left: Normal.       Abdominal:      General: Bowel sounds are normal.   Musculoskeletal:         General: Normal range of motion.      Cervical back: Normal range of motion.   Lymphadenopathy:      Upper Body:      Right upper body: No supraclavicular or axillary adenopathy.      Left upper body: No supraclavicular or axillary adenopathy.   Skin:     General: Skin is warm and dry.   Neurological:      Mental Status: She is alert and oriented to person, place, and time.   Psychiatric:         Mood and Affect: Mood normal.         Behavior: Behavior normal.         Laboratory Data:  No results " found for this or any previous visit (from the past 24 hours).       Imaging: DXA BONE DENSITY AXIAL SKELETON 1 OR MORE SITES     CLINICAL HISTORY:  Age-related osteoporosis without current pathological fracture     TECHNIQUE:  DXA scanning was performed over the bilateral hips and lumbar spine.  Review of the images confirms satisfactory positioning and technique.     COMPARISON:  02/14/2023     FINDINGS:  The L1 to L4 vertebral bone mineral density is equal to 1.192 g/cm squared with a T score of 0.0.     There has been no significant change in bone mineral density in the spine relative to the prior study.     The left femoral neck bone mineral density is equal to 0.919 g/cm squared with a T score of -0.9.     The total hip bone mineral density is equal to 0.993 g/cm squared with a T score of -0.1.     There has been no significant change in the bone mineral density in the hips relative to the prior study.     There is a 3.8% risk of a major osteoporotic fracture and a 0.4% risk of hip fracture in the next 10 years (FRAX).     Impression:     Findings consistent with normal bone mineralization with similar bone mineralization compared to prior study from 02/14/2023 and a 10-year 3.8% probability of major osteoporotic fracture and 0.4% probability of hip fracture based on the FRAX model.     Consider FDA approved medical therapies in postmenopausal women and men aged 50 years and older, based on the following:     *A hip or vertebral (clinical or morphometric) fracture  *T score less than or equal to -2.5 at the femoral neck or spine after appropriate evaluation to exclude secondary causes.  *Low bone mass -- also known as osteopenia (T score between -1.0 and -2.5 at the femoral neck or spine) and a 10 year probability of hip fracture greater than or equal to 3% or a 10 year probability of major osteoporosis-related fracture greater than or equal to 20% based on the [place]-adapted WHO algorithm.  *Clinicians judgment  and/or patient preference may indicate treatment for people with 10 year fracture probabilities is above or below these levels.        Electronically signed by:Manuel Ghanshyam  Date:                                            03/28/2024     Mammogram:  02/17/2025  Bilateral  There is no mammographic evidence of malignancy.     BI-RADS Category:   Overall: 2 - Benign        Recommendation:  Routine screening mammogram in 1 year is recommended.  Assessment:       1. Invasive lobular carcinoma of right breast in female    2. Prophylactic use of anastrozole (Arimidex)         Plan:     Mrs Dailey is tolerating Arimidex well.    She will remain on anastrazole for a minimum of 5 years through the beginning of September 20, 2029.   I reinforced that there is no need for routine lab work or routine radiologic studies other than a yearly mammogram and a bone density scan.    Bone density scan in March revealed normal bone density.  6 kg weight loss noted since previous clinic visit. Pt reports changes to taste buds, existing prior to the start of Arimidex have affected her appetite. She declines nutrition consult today. States that she and her  are working to improve intake.      Visit today included increased complexity associated with the care of her newly diagnosed [id number] ILC .  08/07/25:  HUDSON @ 11 months post start of Anastrozole; continue Anastrozole 1 mg daily; f/u in 3 months with Dr. Alberts.      Med Onc Chart Routing      Follow up with physician 3 months. f/u in 3 months with Dr. Alberts   Follow up with TOMMY    Infusion scheduling note    Injection scheduling note    Labs    Imaging    Pharmacy appointment    Other referrals                Plan was discussed with the patient at length, and she verbalized understanding. Mara was given an opportunity to ask questions that were answered to her satisfaction, and she was advised to call in the interval if any problems or questions arise.    JING Kim,  FNP-C  [facility]  20 minutes were spent in coordination of patient's care, record review and counseling.